# Patient Record
Sex: FEMALE | Race: WHITE | NOT HISPANIC OR LATINO | RURAL
[De-identification: names, ages, dates, MRNs, and addresses within clinical notes are randomized per-mention and may not be internally consistent; named-entity substitution may affect disease eponyms.]

---

## 2020-08-27 ENCOUNTER — HISTORICAL (OUTPATIENT)
Dept: ADMINISTRATIVE | Facility: HOSPITAL | Age: 45
End: 2020-08-27

## 2020-08-27 LAB
25(OH)D3 SERPL-MCNC: 49 NG/ML
ALBUMIN SERPL BCP-MCNC: 4.4 G/DL (ref 3.5–5)
ALBUMIN/GLOB SERPL: 1.3 {RATIO}
ALP SERPL-CCNC: 62 U/L (ref 37–98)
ALT SERPL W P-5'-P-CCNC: 43 U/L (ref 13–56)
ANION GAP SERPL CALCULATED.3IONS-SCNC: 11 MMOL/L (ref 7–16)
AST SERPL W P-5'-P-CCNC: 27 U/L (ref 15–37)
BASOPHILS # BLD AUTO: 0.06 X10E3/UL (ref 0–0.2)
BASOPHILS NFR BLD AUTO: 0.7 % (ref 0–1)
BILIRUB SERPL-MCNC: 0.3 MG/DL (ref 0–1.2)
BUN SERPL-MCNC: 12 MG/DL (ref 7–18)
BUN/CREAT SERPL: 19
CALCIUM SERPL-MCNC: 9.4 MG/DL (ref 8.5–10.1)
CHLORIDE SERPL-SCNC: 103 MMOL/L (ref 98–107)
CHOLEST SERPL-MCNC: 193 MG/DL
CHOLEST/HDLC SERPL: 2.7 {RATIO}
CO2 SERPL-SCNC: 26 MMOL/L (ref 21–32)
CREAT SERPL-MCNC: 0.63 MG/DL (ref 0.5–1.02)
EOSINOPHIL # BLD AUTO: 0.18 X10E3/UL (ref 0–0.5)
EOSINOPHIL NFR BLD AUTO: 2.2 % (ref 1–4)
ERYTHROCYTE [DISTWIDTH] IN BLOOD BY AUTOMATED COUNT: 12.7 % (ref 11.5–14.5)
EST. AVERAGE GLUCOSE BLD GHB EST-MCNC: 90 MG/DL
GLOBULIN SER-MCNC: 3.5 G/DL (ref 2–4)
GLUCOSE SERPL-MCNC: 74 MG/DL (ref 74–106)
HBA1C MFR BLD HPLC: 5.3 %
HCT VFR BLD AUTO: 42 % (ref 38–47)
HDLC SERPL-MCNC: 72 MG/DL
HGB BLD-MCNC: 14.1 G/DL (ref 12–16)
IMM GRANULOCYTES # BLD AUTO: 0.02 X10E3/UL (ref 0–0.04)
IMM GRANULOCYTES NFR BLD: 0.2 % (ref 0–0.4)
LDLC SERPL CALC-MCNC: 91 MG/DL
LYMPHOCYTES # BLD AUTO: 2.38 X10E3/UL (ref 1–4.8)
LYMPHOCYTES NFR BLD AUTO: 29.5 % (ref 27–41)
MCH RBC QN AUTO: 30.9 PG (ref 27–31)
MCHC RBC AUTO-ENTMCNC: 33.6 G/DL (ref 32–36)
MCV RBC AUTO: 92.1 FL (ref 80–96)
MONOCYTES # BLD AUTO: 0.61 X10E3/UL (ref 0–0.8)
MONOCYTES NFR BLD AUTO: 7.5 % (ref 2–6)
MPC BLD CALC-MCNC: 10.7 FL (ref 9.4–12.4)
NEUTROPHILS # BLD AUTO: 4.83 X10E3/UL (ref 1.8–7.7)
NEUTROPHILS NFR BLD AUTO: 59.9 % (ref 53–65)
NRBC # BLD AUTO: 0 X10E3/UL (ref 0–0)
NRBC, AUTO (.00): 0 /100 (ref 0–0)
PLATELET # BLD AUTO: 333 X10E3/UL (ref 150–400)
POTASSIUM SERPL-SCNC: 3.5 MMOL/L (ref 3.5–5.1)
PROT SERPL-MCNC: 7.9 G/DL (ref 6.4–8.2)
RBC # BLD AUTO: 4.56 X10E6/UL (ref 4.2–5.4)
SODIUM SERPL-SCNC: 136 MMOL/L (ref 136–145)
TRIGL SERPL-MCNC: 149 MG/DL
TSH SERPL DL<=0.005 MIU/L-ACNC: 1.21 UIU/ML (ref 0.36–3.74)
WBC # BLD AUTO: 8.08 X10E3/UL (ref 4.5–11)

## 2020-10-15 ENCOUNTER — HISTORICAL (OUTPATIENT)
Dept: ADMINISTRATIVE | Facility: HOSPITAL | Age: 45
End: 2020-10-15

## 2020-10-21 LAB — SARS-COV+SARS-COV-2 AG RESP QL IA.RAPID: NEGATIVE

## 2022-01-09 ENCOUNTER — OFFICE VISIT (OUTPATIENT)
Dept: FAMILY MEDICINE | Facility: CLINIC | Age: 47
End: 2022-01-09
Payer: COMMERCIAL

## 2022-01-09 VITALS — TEMPERATURE: 99 F | HEART RATE: 87 BPM | OXYGEN SATURATION: 95 %

## 2022-01-09 DIAGNOSIS — Z20.822 CONTACT WITH AND (SUSPECTED) EXPOSURE TO COVID-19: ICD-10-CM

## 2022-01-09 DIAGNOSIS — R05.9 COUGH: ICD-10-CM

## 2022-01-09 DIAGNOSIS — F19.939 WITHDRAWAL FROM OTHER PSYCHOACTIVE SUBSTANCE: Primary | ICD-10-CM

## 2022-01-09 LAB
CTP QC/QA: YES
FLUAV AG NPH QL: NEGATIVE
FLUBV AG NPH QL: NEGATIVE
SARS-COV-2 AG RESP QL IA.RAPID: NEGATIVE

## 2022-01-09 PROCEDURE — 99051 PR MEDICAL SERVICES, EVE/WKEND/HOLIDAY: ICD-10-PCS | Mod: ,,, | Performed by: NURSE PRACTITIONER

## 2022-01-09 PROCEDURE — 1159F PR MEDICATION LIST DOCUMENTED IN MEDICAL RECORD: ICD-10-PCS | Mod: ,,, | Performed by: NURSE PRACTITIONER

## 2022-01-09 PROCEDURE — 99213 OFFICE O/P EST LOW 20 MIN: CPT | Mod: ,,, | Performed by: NURSE PRACTITIONER

## 2022-01-09 PROCEDURE — 1160F PR REVIEW ALL MEDS BY PRESCRIBER/CLIN PHARMACIST DOCUMENTED: ICD-10-PCS | Mod: ,,, | Performed by: NURSE PRACTITIONER

## 2022-01-09 PROCEDURE — 1159F MED LIST DOCD IN RCRD: CPT | Mod: ,,, | Performed by: NURSE PRACTITIONER

## 2022-01-09 PROCEDURE — 87428 POCT SARS-COV2 (COVID) WITH FLU ANTIGEN: ICD-10-PCS | Mod: QW,,, | Performed by: NURSE PRACTITIONER

## 2022-01-09 PROCEDURE — 87428 SARSCOV & INF VIR A&B AG IA: CPT | Mod: QW,,, | Performed by: NURSE PRACTITIONER

## 2022-01-09 PROCEDURE — 99051 MED SERV EVE/WKEND/HOLIDAY: CPT | Mod: ,,, | Performed by: NURSE PRACTITIONER

## 2022-01-09 PROCEDURE — 99213 PR OFFICE/OUTPT VISIT, EST, LEVL III, 20-29 MIN: ICD-10-PCS | Mod: ,,, | Performed by: NURSE PRACTITIONER

## 2022-01-09 PROCEDURE — 1160F RVW MEDS BY RX/DR IN RCRD: CPT | Mod: ,,, | Performed by: NURSE PRACTITIONER

## 2022-01-30 NOTE — PROGRESS NOTES
EDDIE Huitron   [facility]  [address] [place] [id number]  [phone]      PATIENT NAME: Ebony Lucero  : 1975  DATE: 22  MRN: [id number]      Billing Provider: EDDIE Huitron  Level of Service:   Patient PCP Information     Provider PCP Type    Primary Doctor No General          Reason for Visit / Chief Complaint: URI (Coughing, congestion, drainage, headache, body aches, joint aches, ear aches; started Thursday; vaccinated)       Update PCP  Update Chief Complaint         History of Present Illness / Problem Focused Workflow     Patient presents to clinic with the above listed complaints x 4 days. States she recently tried to stop her antidepressant and believes her symptoms may be attributed to that.       Review of Systems     Review of Systems   Constitutional: Negative for chills, diaphoresis, fatigue and fever.   HENT: Positive for congestion. Negative for ear pain, facial swelling and trouble swallowing.    Eyes: Negative for pain, discharge, redness, itching and visual disturbance.   Respiratory: Positive for cough. Negative for apnea, chest tightness, shortness of breath and wheezing.    Cardiovascular: Negative for chest pain, palpitations and leg swelling.   Gastrointestinal: Negative for abdominal pain, constipation, diarrhea, nausea and vomiting.   Genitourinary: Negative for dysuria.   Musculoskeletal: Positive for myalgias.   Skin: Negative for rash.   Neurological: Positive for headaches. Negative for dizziness.       Medical / Social / Family History   History reviewed. No pertinent past medical history.    History reviewed. No pertinent surgical history.    Social History  Ms.      Family History  Ms.'s family history is not on file.    Medications and Allergies     Medications  No outpatient medications have been marked as taking for the 22 encounter (Office Visit) with EDDIE Huitron.       Allergies  Review  of patient's allergies indicates:  No Known Allergies    Physical Examination     Vitals:    01/09/22 0815   Pulse: 87   Temp: 98.6 °F (37 °C)     Physical Exam  Constitutional:       General: She is not in acute distress.     Appearance: She is ill-appearing.   HENT:      Right Ear: External ear normal.      Left Ear: External ear normal.      Nose: Nose normal.   Eyes:      Extraocular Movements: Extraocular movements intact.      Conjunctiva/sclera: Conjunctivae normal.      Pupils: Pupils are equal, round, and reactive to light.   Cardiovascular:      Rate and Rhythm: Normal rate and regular rhythm.      Pulses: Normal pulses.      Heart sounds: Normal heart sounds.   Pulmonary:      Effort: Pulmonary effort is normal.      Breath sounds: Normal breath sounds.   Neurological:      Mental Status: She is alert.         Assessment and Plan (including Health Maintenance)      Problem List  Smart Sets  Document Outside HM   :    Health Maintenance Due   Topic Date Due    Hepatitis C Screening  Never done    Lipid Panel  Never done    COVID-19 Vaccine (1) Never done    HIV Screening  Never done    TETANUS VACCINE  Never done    Mammogram  Never done    Cervical Cancer Screening  Never done    Influenza Vaccine (1) Never done       Problem List Items Addressed This Visit    None     Visit Diagnoses     Contact with and (suspected) exposure to covid-19    -  Primary    Relevant Orders    POCT SARS-COV2 (COVID) with Flu Antigen (Completed)          The patient has no Health Maintenance topics of status Not Due    No future appointments.       Signature:  EDDIE Huitron  [facility] [address] [address]  [phone]    Date of encounter: 1/9/22

## 2024-05-13 RX ORDER — BACLOFEN 10 MG/1
10 TABLET ORAL 3 TIMES DAILY
COMMUNITY

## 2024-05-13 RX ORDER — OXYCODONE HCL 20 MG/1
20 TABLET, FILM COATED, EXTENDED RELEASE ORAL EVERY 12 HOURS
COMMUNITY

## 2024-05-13 RX ORDER — OXYCODONE AND ACETAMINOPHEN 10; 325 MG/1; MG/1
1 TABLET ORAL EVERY 8 HOURS PRN
COMMUNITY

## 2024-05-15 ENCOUNTER — ANESTHESIA (OUTPATIENT)
Dept: PAIN MEDICINE | Facility: HOSPITAL | Age: 49
End: 2024-05-15
Payer: COMMERCIAL

## 2024-05-15 ENCOUNTER — ANESTHESIA EVENT (OUTPATIENT)
Dept: PAIN MEDICINE | Facility: HOSPITAL | Age: 49
End: 2024-05-15
Payer: COMMERCIAL

## 2024-05-15 ENCOUNTER — HOSPITAL ENCOUNTER (OUTPATIENT)
Facility: HOSPITAL | Age: 49
Discharge: HOME | End: 2024-05-15
Attending: ANESTHESIOLOGY | Admitting: ANESTHESIOLOGY
Payer: COMMERCIAL

## 2024-05-15 VITALS
SYSTOLIC BLOOD PRESSURE: 115 MMHG | OXYGEN SATURATION: 100 % | HEIGHT: 62 IN | DIASTOLIC BLOOD PRESSURE: 64 MMHG | BODY MASS INDEX: 28.52 KG/M2 | WEIGHT: 155 LBS | TEMPERATURE: 98 F | RESPIRATION RATE: 17 BRPM | HEART RATE: 62 BPM

## 2024-05-15 DIAGNOSIS — M54.12 CERVICAL RADICULOPATHY: ICD-10-CM

## 2024-05-15 PROCEDURE — 27000284 HC CANNULA NASAL: Performed by: NURSE ANESTHETIST, CERTIFIED REGISTERED

## 2024-05-15 PROCEDURE — D9220A PRA ANESTHESIA: Mod: ,,, | Performed by: NURSE ANESTHETIST, CERTIFIED REGISTERED

## 2024-05-15 PROCEDURE — 37000008 HC ANESTHESIA 1ST 15 MINUTES: Performed by: ANESTHESIOLOGY

## 2024-05-15 PROCEDURE — 62321 NJX INTERLAMINAR CRV/THRC: CPT | Performed by: ANESTHESIOLOGY

## 2024-05-15 PROCEDURE — 63600175 PHARM REV CODE 636 W HCPCS: Performed by: NURSE ANESTHETIST, CERTIFIED REGISTERED

## 2024-05-15 PROCEDURE — 25500020 PHARM REV CODE 255: Performed by: ANESTHESIOLOGY

## 2024-05-15 PROCEDURE — 25000003 PHARM REV CODE 250: Performed by: NURSE ANESTHETIST, CERTIFIED REGISTERED

## 2024-05-15 PROCEDURE — 63600175 PHARM REV CODE 636 W HCPCS: Mod: JG | Performed by: ANESTHESIOLOGY

## 2024-05-15 RX ORDER — PANTOPRAZOLE SODIUM 40 MG/1
40 TABLET, DELAYED RELEASE ORAL DAILY
COMMUNITY

## 2024-05-15 RX ORDER — TRIAMCINOLONE ACETONIDE 40 MG/ML
INJECTION, SUSPENSION INTRA-ARTICULAR; INTRAMUSCULAR CODE/TRAUMA/SEDATION MEDICATION
Status: DISCONTINUED | OUTPATIENT
Start: 2024-05-15 | End: 2024-05-15 | Stop reason: HOSPADM

## 2024-05-15 RX ORDER — BUPIVACAINE HYDROCHLORIDE 2.5 MG/ML
INJECTION, SOLUTION INFILTRATION; PERINEURAL CODE/TRAUMA/SEDATION MEDICATION
Status: DISCONTINUED | OUTPATIENT
Start: 2024-05-15 | End: 2024-05-15 | Stop reason: HOSPADM

## 2024-05-15 RX ORDER — IOPAMIDOL 612 MG/ML
INJECTION, SOLUTION INTRATHECAL CODE/TRAUMA/SEDATION MEDICATION
Status: DISCONTINUED | OUTPATIENT
Start: 2024-05-15 | End: 2024-05-15 | Stop reason: HOSPADM

## 2024-05-15 RX ORDER — LIDOCAINE HYDROCHLORIDE 20 MG/ML
INJECTION, SOLUTION EPIDURAL; INFILTRATION; INTRACAUDAL; PERINEURAL
Status: DISCONTINUED | OUTPATIENT
Start: 2024-05-15 | End: 2024-05-15

## 2024-05-15 RX ORDER — PROPOFOL 10 MG/ML
VIAL (ML) INTRAVENOUS
Status: DISCONTINUED | OUTPATIENT
Start: 2024-05-15 | End: 2024-05-15

## 2024-05-15 RX ADMIN — LIDOCAINE HYDROCHLORIDE 50 MG: 20 INJECTION, SOLUTION INTRAVENOUS at 09:05

## 2024-05-15 RX ADMIN — PROPOFOL 100 MG: 10 INJECTION, EMULSION INTRAVENOUS at 09:05

## 2024-05-15 RX ADMIN — PROPOFOL 50 MG: 10 INJECTION, EMULSION INTRAVENOUS at 09:05

## 2024-05-15 NOTE — DISCHARGE SUMMARY
Patient underwent   Catheter Guided Cervical Epidural Steroid Injection under Fluoroscopic Guidance at C5-6  level procedure 05/15/2024. The pt will follow up in clinic. Discharged home. Discharge Dx:   Cervical Radiculopathy

## 2024-05-15 NOTE — H&P
[facility] - Pain Management  Pain Management  H&P    Patient Name: Ebony Lucero  MRN: [id number]  Admission Date: 5/15/2024  Primary Care Provider: Fabiana Primary Doctor    Patient information was obtained from     Subjective:     Principal Problem:  MITESH HOLGUIN MD,P.A.  [address] [place] [id number]                      RE: Ebony Lucero      : 1975   Date of Service: 2024   Medication Refill   Existing Patient 1 month follow up           Chief Complaint:   Neck pain described as constant,  Back pain burning in nature, constant, tingling; located in the thoracic region; aggravated by sitting, standing; relieved by heat, rest. Patient stated there is no improvement of back pain since last visit..   Patient seated in exam room 10 with c/o back pain  Controlled Substance Prescription Agreement signed and reviewed. Verbalizes understanding. 3-22-24  [place] Prescription Monitoring Program data was reviewed for this patient for the past 12 calendar months to ascertain any current,or past use of scheduled medications.                                                                                    Opioid Risk Tool                                                                                 Wayne each box                           Item Score                        Item Score                                                                              that applies.                               if Female.                          if Male                    1. Family history of substance abuse                  Alcohol  (  )                                      1                                     3                                                                              Illegal Drugs (  )                               2                                     3                                                                              Prescription Drugs (  )                     4                                      4        2. Personal History of substance abuse          Alcohol  (  )                                      3                                      3                                                                             Illegal Drugs (  )                               4                                     4                                                                             Prescription Drugs (  )                     5                                      5     3. Age (Wayne box if 16-45)                                           (  )                                          1                                      1        4. History of Preadolescent Sexual Abuse                   (  )                                         3                                      0        5. Psychological Disease    Attention Deficit disorder  (  )                                         2                                       2                                                             or                                              Obsessive Compulsive                                                           disorder                                                               or                                                                                       Bipolar Schizophrenia                                                              Depression                        (  )                                         1                                        1        Total=1     Total Score Risk Category           Low risk 0-3         Moderate risk 4-7              High risk >8           History of Present Illness:   What part of the body? back/neck/mid back   Pain level at present 6      03/22/2024-History of present illness  Ebony Lucero  is a  48y/o female who presents for evaluation and management of chronic pain in her lower back and bilateral hips.   Subjective: Patient  presents today for evaluation for pain problems. Pt reports that pain started has been chronic for about 6 years but has been ongoing for years after accident. She ahs treated with TPC but was ready for a change. After review of records, pts physical exam and discussion with the patient we will plan to continue her meds. We will send her to PT for back strengthening exercises. She will consider TFLESI L5-S1, Consider TESI and consider SCS.      04/23/2024-pt here for follow up after her new pt appt and reports that she would like to schedule procedure for her neck and arm pain. We have reviewed her Cervical spine MRI again with the pt and after discussion with the pt based on her physical exam, symptoms and her MRI we will plan to schedule her for a Cath guided OTONIEL @ C5-6 level for dx of cervical radiculopathy.    Due to the presence of cervical radicular symptoms, we have elected to proceed with cervical epidural steroid injections at the level confirmed by physical examination and accompanying studies. This is medically necessary to improve function, reduce pain and limitations, and to reduce the reliance on pain medications. Additional epidural steroid injections will be based on patient improvement. Cath guided OTONIEL @ C5-6 level (M54.12) - Cervical radiculopathy     Cervical MRI dated 08/24/2023C5-6: Bilateral facet arthropathy. Disc is desiccated and mildly narrowed. There is disc bulge with superimposed broad-based posterior disc herniation measuring 3 mm AP dimension beyond vertebral body margin. There is mild abutment of the ventral cord with minimal cord flattening and mild-moderate canal stenosis. Extension of broad-based disc herniation and associated uncinate hypertrophy in combination with the facet arthropathy results in moderate to severe bilateral foraminal stenosis, slightly greater on the left.     The previous urine drug screen was evaluated   , New pt UDS.         We have previously discussed the  new CDC guidelines for opioid prescribing practices. We have educated the patient about morphine milliequivalents. Patient has voiced understanding that medications may be decreased over the next several months if we do not find adequate pain control or increased function as result of medication. We have also discussed with the patient that more frequent follow-ups will be based on the amount of pain medications used daily.         Nursing:   Is it helping? Yes  Physical Therapy  no Home Exercises  no New medical problems or surgeries  no New medications  no New allergies  no New antibiotics, fever, infection      Current Medications:   Baclofen Oral Tablet 10 MG (4/9/2024) Take 1 to 1and1/2 tablets every night at bedtime for 30 day(s)  oxyCODONE HCl ER Oral Tablet ER 12 Hour Abuse-Deterrent 20 MG (4/22/2024) Take 1 tablet every 12 hours for 30 day(s)  Percocet Oral Tablet  MG (4/22/2024) Take 1 tablet three times a day as needed for 30 day(s)      Previous Studies:  MRI; Findings  STUDY:  MRI thoracic spine  HISTORY:  Back pain right lower extremity pain for 6 years. Original injury 2018 related to a fall.  TECHNIQUE: Sagittal T1 weighted, T2 weighted and STIR images of the thoracic spine and axial T1 weighted and T2 weighted images of the thoracic spine are presented.    FINDINGS:   Coronal  view demonstrates moderate levoscoliosis of the thoracic spine. The height of the thoracic vertebral bodies is maintained and there is otherwise normal kyphotic curvature and alignment. There is some disc space narrowing and loss of disc signal consistent with disc desiccation discogenic change from the T5-6 through T9-10 levels. The remaining discs are normally hydrated. No obvious abnormal signal within the cord is seen.  T6-7: There is a prominent right paracentral disc herniation extending approximately 4 mm beyond the vertebral body margin and impinging on the right ventral aspect of the cord. There is  flattening of the right ventral aspect of the cord. No central canal stenosis is seen. The neural canals remain patent superiorly.  T7-8: There is a left paracentral left lateral disc herniation extending approximately 3 mm beyond the vertebral body margin contributing to some narrowing of the left neural canal. No cord deformity is seen. No central canal stenosis is seen. The right neural canal is patent.  T9-10: There is a large left paracentral left lateral disc herniation which extends approximately 6-7 mm beyond the vertebral body margin and extends both above and below the disc level impinging on the left ventral aspect of the cord causing flattening of the left ventral aspect of the cord and contributing to mild central canal stenosis. Disc contributes to left neural canal compromise. The right neural canal remains patent superiorly.    IMPRESSION:     1. Moderate levoscoliosis of the thoracic spine.  2. Prominent right paracentral disc herniation T6-7 with cord impingement.  3. Left paracentral left lateral disc herniation T7-8 contributing to left neural canal compromise.  4. Large left paracentral left lateral disc herniation T9-10 with cord impingement, central canal stenosis and left neural canal compromise.      Electronically Signed by AWILDA CALDERON at 14-Dec-2022 06:33:52 AM     Contrast Type and Amount: NO CONTRAST     EXAM:MRI cervical spine without contrast  HISTORY:  Cervical radiculopathy. Left-sided neck pain.  COMPARISON:  No prior exam of the cervical spine. Correlation made with MRI thoracic spine December 2022  TECHNIQUE:   Sagittal T1, T2, stir and axial T2 sequences of the cervical spine were performed without intravenous contrast.  FINDINGS:   Cervical vertebral alignment is normal. Vertebral bodies normal in height. Marrow signal pattern exhibited in the vertebrae is normal.  Imaged portions of the posterior fossa and craniocervical junction are normal.  C1-2: Alignment is normal. There is  no canal narrowing.  C2-3: No disc herniation or canal stenosis. Neural foramina are patent.  C3-4: Uncinate hypertrophy and facet arthropathy mildly narrows right foraminal caliber. No disc herniation or central canal stenosis.  C4-5: Bilateral facet arthropathy.  The disc is desiccated and mildly bulging circumferentially approximately 2 mm. Disc bulge in combination with uncinate hypertrophy causes moderate right and mild left foraminal stenosis. Disc bulging mildly deforms the anterior thecal sac but there is no cord contact. Mild canal stenosis.  C5-6: Bilateral facet arthropathy. Disc is desiccated and mildly narrowed. There is disc bulge with superimposed broad-based posterior disc herniation measuring 3 mm AP dimension beyond vertebral body margin. There is mild abutment of the ventral cord with minimal cord flattening and mild-moderate canal stenosis. Extension of broad-based disc herniation and associated uncinate hypertrophy in combination with the facet arthropathy results in moderate to severe bilateral foraminal stenosis, slightly greater on the left.  C6-7: Bilateral facet arthropathy. Disc desiccation and disc bulge with superimposed broad-based disc herniation centrally and lateralizing to the left. This disc herniation measures up to 3.4 mm beyond expected vertebral body margin. There is effacement of CSF anterior to the spinal cord with mild canal stenosis. Asymmetric extension of disc herniation into the left neural foramen causes severe left foraminal stenosis.  C7-T1: No disc herniation or significant spinal canal or foraminal narrowing. Upper thoracic levels are unremarkable.  No cord signal alteration. No concerning abnormality exhibited in the immediate paraspinal soft tissues.  IMPRESSION:     Broad-based posterior disc herniation centrally and lateralizing to the left at C6-7 effaces CSF anterior to the spinal cord mild canal stenosis and extends into and causes severe narrowing of the  left neural foramen in combination with uncinate hypertrophy and facet arthropathy.  Disc bulge with superimposed broad-based posterior disc herniation and vertebral osteophyte complex at C5-6 causes mild cord abutment and mild to moderate canal stenosis. In combination with uncinate hypertrophy and facet arthropathy there is severe bilateral foraminal narrowing.  No cord signal alteration.    Electronically Signed by VALERIA ERIC at 24-Aug-2023 11:51:10 AM     Contrast Type and Amount: NO CONTRAST  <Addendum Signed by VALERIA ERIC at 22-Dec-2023 10:29:54 AM  No unanticipated enhancement detected on postcontrast images.  Addendum End>  EXAM: MRI lumbar spine with and without contrast  HISTORY:  Lumbar radiculopathy. Back injury from a fall with pain in the upper left side  COMPARISON:  No previous lumbar spine exam. Correlation is made with MRI thoracic spine December 2022   TECHNIQUE:   Sagittal T1, T2, stir and axial T1 and T2 weighted sequences of the lumbar spine were performed before intravenous contrast. Subsequently intravenous contrast was administered and axial and sagittal T1 weighted sequences were performed.  FINDINGS:    There are postoperative changes of anterior and posterior fusion at L5-S1 across grade 1 spondylolisthesis. Bilateral pedicle screws and rods and anterior and interbody fusion hardware are present related to the fusion. There is no significant surrounding marrow or soft tissue edema to strongly indicate hardware complication. Above this level alignment of lumbar vertebrae is normal. Vertebral bodies are normal in height. Lower cord is normal in signal. Conus terminates at T12-L1.  Mild desiccation of disc at L4-5. Minimal osteophytosis and very subtle discogenic Modic type 2 endplate signal changes are present in the L4 and L5 vertebral bodies.  L1-2: No disc herniation or canal or foraminal narrowing.  L2-3: No disc herniation or canal or foraminal stenosis.  L3-4: No disc  herniation or canal or foraminal stenosis.  L4-5: Disc is desiccated and there is 2 mm of circumferential bulging. No significant nerve root displacement or spinal canal stenosis. Neural foramina are patent.  L5-S1: Postoperative changes of fusion across grade 1 spondylolisthesis at this level. There is no residual disc. No spinal canal or foraminal stenosis.  Upper sacrum is unremarkable. No concerning abnormality exhibited in the immediate paraspinal soft tissues.  IMPRESSION:     Postoperative changes of fusion at L5-S1. No evidence of hardware complication or canal stenosis at the operative level. There is underlying grade 1 spondylolisthesis at this level.  Circumferential disc bulging at L4-5 without canal stenosis.  No acute fracture.     Electronically Signed by VALERIA ERIC at 22-Dec-2023 10:15:52 AM  Amended by VALERIA ERIC at 22-Dec-2023 10:29:54 AM  Contrast Type and Amount: 13ML OF 15ML CLARISCAN GIVEN  EXAM: MRI thoracic spine without contrast  HISTORY:  Thoracic radiculopathy. Upper to mid back pain with recent worsening extending to the left side with some weakness.  COMPARISON:  MRI thoracic spine December 2022   TECHNIQUE:   Sagittal T1, T2, stir and axial T1 and T2 weighted sequences of the thoracic spine were performed without intravenous contrast.  FINDINGS:    Mildly accentuated thoracic kyphosis.  Moderate levoscoliosis of the thoracic spine also again exhibited.  The vertebral bodies normal in height.  There is multilevel disc desiccation and disc narrowing similar in pattern to the degenerative changes present on prior exam predominantly involving T5 through T10 levels.  Throughout these levels there are marginal osteophytes from anterior vertebral bodies .  There has been development of adjacent endplate defects in inferior T9 and superior T10 vertebral bodies with surrounding edema suggesting Schmorl's nodes and discogenic type 1 endplate signal change.  Mild chronic Modic type 2  predominant endplate signal changes are also seen in adjacent vertebral bodies at T6-7 and T7-8.  T1-2:  1 mm disc bulge.  No cord contact or canal narrowing.  T2-3:  1 mm disc bulge minimally deforms the anterior thecal sac lateralizing to the left. No cord contact or canal stenosis. Caliber of left neural foramen appears mildly narrowed from disc bulging and facet arthropathy.  Similar findings were present previously.  T3-4:  No disc herniation or canal narrowing.  T4-5:  No disc herniation or canal narrowing.  T5-6:  Disc is narrowed anteriorly. However there is no significant posterior disc herniation or canal narrowing.  T6-7:  Chronic degeneration of the disc which is moderately desiccated and narrowed.  Posteriorly there is a large right paracentral disc herniation and vertebral osteophyte complex that measures 4 mm AP dimension deforming right aspect of the spinal cord with moderate indentation of the right ventral aspect of the cord that is very similar to that seen previously.  Adequate CSF remains present posterior to the cord without significant central canal stenosis.  Neural foramina are patent.  T7-8:  3 mm AP dimension left paracentral disc herniation deforms left anterior aspect of the thecal sac. This is unchanged.  No significant spinal canal narrowing.    T8-9:  1 mm of disc bulging.  No cord contact or canal narrowing.  T9-10:  Disc is desiccated and significantly narrowed.  There is a large left paracentral predominant disc herniation and vertebral osteophyte complex measuring 6 mm beyond vertebral body margin in AP dimension and approximately 10-11 mm craniocaudal extent moderately deforming the left aspect of the spinal cord displacing the cord to the right.  There is canal stenosis and an element of narrowing of the proximal left neural foramen.  No significant change compared to prior exam.  T10-11:  No disc herniation or canal narrowing.  T11-12:  No disc herniation or canal  narrowing.  T12-L1:  No disc herniation or canal narrowing.  No areas of cord signal alteration despite the significant cord deformity again exhibited at T6-7 and T9-10 levels.  No concerning abnormality exhibited in the immediate paraspinal soft tissues.  IMPRESSION:     Levoscoliosis of the thoracic spine with multilevel disc degeneration and redemonstration of large chronic disc herniations causing significant cord deformity at T9-10 and T6-7 that are very similar to that seen previously.  Left paracentral disc herniation at T7-8 also appears similar in size compared to prior exam measuring approximately 3 mm AP dimension deforming left anterior aspect of the thecal sac.  There has been development of endplate defects typical for Schmorl's nodes in T9 and T10 vertebral bodies with rim of surrounding marrow edema about the Schmorl's nodes.  The appearance of the thoracic spine has otherwise not significantly changed.  No cord signal alteration.      Electronically Signed by VALERIA ERIC at 15-Feb-2024 03:02:05 PM     Contrast Type and Amount: NO CONTRAST GIVEN   Surgical History:   Hysterectomy; Spinal Surgery   Uterine Suspension  Cholecystectomy  ALIF L5-S1      Social History:      Smoking Status: Never smoker; Last Reviewed: 04/22/2024                        Denies alcohol use  No drug use  Occupation: works full-time  Marital status: Single               Family History:   There is a family history of  Hypertension, Cancer  Heart Disease  .      Review of Systems:   General:  Patient denies  sweats, fatigue, fever, chills, recent change in weight.  Eyes:  Patient denies  blurred vision, glaucoma.  Ears, Nose and Throat:  Patient denies  hearing loss, ringing in the ears, sinus congestion, difficulty swallowing.  Cardiovascular:  Patient denies  arrhythmia, chest pain, palpitations.  Respiratory:  Patient denies  requiring oxygen, shortness of breath, cough, wheezing.  Gastrointestinal:  Patient denies   ulcer, heartburn, nausea, vomiting, blood in stool, diarrhea, constipation, hemorrhoids.  Genitourinary:  Patient denies  hematuria, kidney stones, urinary frequency, polyuria, urinary hesitancy, dysuria, burning on urination, prostate problems, menstrual complications, painful intercourse.  Endocrine:  Patient denies  polydipsia, temperature intolerance, thyroid problems, loss of hair from head or body.  Hematologic:  Patient denies  bleeding tendencies, easy bruising tendency, bleeding disorders, bleeding gums.  Musculoskeletal:   Patient admits to   joint pain, joint stiffness, muscle cramps.  Patient denies  fractures, walking aids.  Neurologic  Patient denies  dizziness, seizures, headache, not confused or disoriented, memory lapses or loss, paralysis, difficulty walking.  Psychologic:  Patient denies  anxiety, panic attacks, mood disorder, emotional problems, depression, sleep disturbances, suicidal thoughts, suicide attempt(s).  Skin:  Patient denies  pruritus, jaundice, skin rash.       DEPRESSION SCREENING:   Not at all the patient reports little interest or pleasure in doing things.  Not at all the patient reports feeling down, depressed, or hopeless.  Date Depression Screening Last Done: 04/22/2024   PHQ-2 Score 0; PHQ-9 Score incomplete      Vital Signs:   Weight 139 lbs; Height 5 ft 2 in; BMI 25.4   04/22/2024 11:20 AM (CST)  Respiration Rate 18; Pulse Rate 69 bpm; Blood Pressure 105 / 72 mm/Hg; Pain Level: 6         Physical Examination:   Cranial Nerves II-XII grossly intact.  No apparent distress.  No somnolence or slurred speech.     Lumbar spine  patient has pain with flexion and extension lateral rotation  Lumbar facets are tender to palpation  No focal neurologic deficits noted      Back Motion:   Lumbar / lumbosacral spine abnormal.         Additional Physical Findings:  General abnormal,   Awake, alert, oriented to time, place and person, well developed, pleasant, uncomfortable,  seated  Musculoskeletal abnormal,   Thoracic spine abnormal, thoracolumbar spine abnormal  Posture normal  Neurologic normal neurologic exam,   Cranial nerves, motor function  Gait and stance normal  Skin normal skin exam,   Dry, warm       Toxicology Report   Toxicology was performed.   Reason for Toxicology:  A presumptive urine drug screen was done today to rapidly obtain and integrate results into clinical assessment and decision-making for ongoing safe prescribing of controlled substances.                                       Assessment:   (M54.50) - Low back pain  (M47.817) - Lumbosacral spondylosis without myelopathy  (M54.6) - Thoracic back pain  (M54.2) - Neck pain  (M40.209) - Kyphosis  (Z98.890) - Hx of lumbosacral spine surgery  (M54.12) - Cervical radiculopathy  (G89.29) - Other chronic pain  (Z79.891) - Long term (current) use of opiate analgesic      Plan:   Physical Activity Counseling   Nutrition Counseling      Analgesia: Patient reports adequate levels on analgesia.  Activity: Patient encouraged to exercise and continue normal activities.  Adverse Reactions:  No adverse reactions  Aberrant Behavior: No aberrant behavior noted.  appropriate and UDS consistent  Affect: Normal mood.  Adjuncts:        Functional Goals: Patient will have decreased pain with current medications and have increased function/activities.  Progress toward functioning goals: Pt will maintain/and or have increase in function and activity with current meds.  Reason for initiating/continuing opioids: Improve function, reduce pain, reduce use of er/urgent care and minimize organ toxicity from analgesics.  Continue medication doses as currently prescribed:  Other treatment modalities/referrals recommended:  Risks and benefits of test or referrals discussed:  Urine Drug screen ordered:  Contract/agreement signed or updated using lay language.  Follow up interval:  1-3 months  Follow up with Dr. Briceño.        1. The patient  has chronic nonmalignant pain with pathology likely contributing to the symptoms and based on the improvement of pain and function in response to opioid medications; lack of serious side effects and limited identifiable aberrant behavior; I believe it is reasonable to prescribe opioid therapy which requires a greater than 72 hours supply of opioid therapy. Patient was educated on the potential dependency issues associated with long-term opioid use; as well as the decreasing efficacy with prolonged use. Patient has been advised of the risk/benefits and side effect and how to utilize each medication. Patient was informed that and deviation from therapy protocol could lead to discontinuation of opioids. Patient was given the opportunity to begin weaning off opioids. Patient was given and opportunity to ask and have questions answered regarding the continuation of opioid therapy. At the time patient is at goal in terms of the pain treatment plan. Patients medications have been reviewed with patient. We will follow up with patient to review effectiveness of medication, and to discuss any potential side effects. The morphine milliequivalents (MME) have been calculated for this patient. According to the CDC guidelines, patients with an MME less than 50 should be monitored for pain and function frequently. Therefore this patient will be monitored every 1-3 months via office visits,  evaluations, and urinary drug screens.     2. Follow up in 2 weeks after procedure.  Patient may contact office for earlier follow-up should an issue arise.     3. Due to the presence of cervical radicular symptoms, we have elected to proceed with cervical epidural steroid injections at the level confirmed by physical examination and accompanying studies. This is medically necessary to improve function, reduce pain and limitations, and to reduce the reliance on pain medications. Additional epidural steroid injections will be based on patient  improvement. Cath guided OTONIEL @ C5-6 level (M54.12) - Cervical radiculopathy     Cervical MRI dated 08/24/2023C5-6: Bilateral facet arthropathy. Disc is desiccated and mildly narrowed. There is disc bulge with superimposed broad-based posterior disc herniation measuring 3 mm AP dimension beyond vertebral body margin. There is mild abutment of the ventral cord with minimal cord flattening and mild-moderate canal stenosis. Extension of broad-based disc herniation and associated uncinate hypertrophy in combination with the facet arthropathy results in moderate to severe bilateral foraminal stenosis, slightly greater on the left.     4. Procedure instructions given to pt who verbalized understanding. Procedure sheet given to the patient after verbally voicing understanding of the sheet concerning blood thinners, NPO status, and importance of a .     5. Monitored Anesthesia Care medical necessity authorization request:       Monitor anesthesia request is medically indicated for the scheduled ___Cath guided OTONIEL Cervical _____procedure due to:     - needle phobia and anxiety, placing the patient at risk during the provided service._____  - patient has a BMI greater than 45 ____  - patient has severe sleep apnea for which BiPAP and oxygen are needed while sleeping._____  - patient is unable to follow simple commands due to mental state.____  - patient has an ASA class greater than 3 and requires constant presence of an anesthesiologist/CRNA during the procedure.____  - patient has severe problems with muscles and muscle spasticity that makes it hard to lie still. ____  - patient suffers from chronic pain and is unable to function due to diminished ADL's.____  - patient is dependent on opioids or sedatives.____  - Other ____     NARCOTIC STATEMENT  Patient is taking the narcotic pain medications as prescribed. Refill is being given because of the benefit to the patient in regards to the pain. Patient has agreed not to  abuse of medication and not to take it more than what is prescribed. The nature of the drug including the potential for addiction and dependency and abuse was also discussed with the patient. Patient has developed physical dependency for the narcotic pain medication for his pain relief.  Patient has also developed tolerance to the sedative effect of the narcotic pain medications.  Patient has decided to continue with these medications despite potential for addiction as described by this office.  This was stressed to the patient that it is the patient's responsibility to secure the narcotic medication and in any event of loss for any reason whatsoever,  there will be no refill before the next due date. Patient also understands that they are not supposed to drive or work on machinery while taking these medications.  Also explained to the patient that in the event of traffic citation, the presence of this drug in  bloodstream may result in DUI.  Patient has been advised not to drink alcohol while taking this medication.  Patient has verbalized understanding of our office policy and has signed a contract with us in this regard.        Compliance Statement:  Documented by Joan Ryan RN acting as a scribe for Claude Briceño M.D. The note accurately reflects work and decisions performed by me.      Prescriptions Written Today:  Lyrica Oral Capsule 50 MG  Take 1 capsule three times a day for 30 day(s)  Refills: 2  Rx quantity: 90,  oxyCODONE HCl ER Oral Tablet ER 12 Hour Abuse-Deterrent 20 MG  Take 1 tablet every 12 hours for 30 day(s)  Refills: No Refills  Rx quantity: 60  Take 1 tablet every 12 hours for 30 day(s)  Refills: No Refills  Rx quantity: 60,  Percocet Oral Tablet  MG  Take 1 tablet three times a day as needed for 30 day(s)  Refills: No Refills  Rx quantity: 90                 Claude Briceño MD      Electronically signed: 4/23/2024 7:28:43 AM       Chief Complaint:      HPI:       Assessment/Plan:          Claude Briceño MD  Pain Management  [facility] - Pain Management

## 2024-05-15 NOTE — PLAN OF CARE
Plan:  D/c pt via wheelchair at 1030  Informed pt if does not void in 8 hours to go to ER. Notify if redness, drainage, from injection site or fever over next 3-4 days. Rest and drink plenty of fluids for the remainder of the day. No lifting over 5 lbs. For the remainder of the day. Continue regular medications as prescribed. May take pain medications as prescribed.     Pain improved 100%  Pre-procedure pain: 5  Post-procedure pain: 0

## 2024-05-15 NOTE — ANESTHESIA POSTPROCEDURE EVALUATION
Anesthesia Post Evaluation    Patient: Ebony Lucero    Procedure(s) Performed: Procedure(s) (LRB):  INJECTION, STEROID, SPINE, CERVICAL, EPIDURAL (N/A)    Final Anesthesia Type: MAC      Patient participation: Yes- Able to Participate  Level of consciousness: awake and alert  Post-procedure vital signs: reviewed and stable  Pain management: adequate  Airway patency: patent    PONV status at discharge: No PONV  Anesthetic complications: no      Cardiovascular status: blood pressure returned to baseline, hemodynamically stable and stable  Respiratory status: unassisted  Hydration status: euvolemic  Follow-up not needed.  Comments: See nursing note for post op pain/taylor score.              Vitals Value Taken Time   /64 05/15/24 1025   Temp 36.9 °C (98.4 °F) 05/15/24 1002   Pulse 68 05/15/24 1027   Resp 17 05/15/24 1025   SpO2 100 % 05/15/24 1027   Vitals shown include unfiled device data.      Event Time   Out of Recovery 10:25:00         Pain/Taylor Score: Taylor Score: 10 (5/15/2024 10:25 AM)

## 2024-05-15 NOTE — OP NOTE
05/15/2024  Ebony Lucero 1975    PREOPERATIVE DIAGNOSIS:     Cervical Radiculopathy                                                              Neck Pain     POSTOPERATIVE DIAGNOSIS:  Cervical Radiculopathy                                                              Neck Pain     PROCEDURE:  Catheter Guided Cervical Epidural Steroid Injection under Fluoroscopic Guidance at C5-6  level.      SURGEON: Dr Claude Briceño  COMPLICATIONS: None  ANESTHESIA:  MAC  DRAINS AND PACKS:  None  BLOOD LOSS:  None     The patient was identified in the holding area.  The risks and benefits of the procedure were again explained to the patient and the patient agreed to proceed.  The patient was taken in stable condition to the procedure room and was placed in prone position on the C-Arm table.  All pressure points were checked and padded comfortably while the patient was awake.  Time out was completed.  Standard ASA monitors applied.  Anesthesia was initiated.  Patient was comfortable and the neck was then prepped and draped in usual sterile fashion. The skin wheal  using Bupivacaine 0.25% (2.5 mg/ml) 1cc was raised over the C7-T1 interspaces and an 18 gauge needle was advanced under fluoroscopic guidance into the epidural space with loss of resistance confirmed with air.  The stylet was removed and there was negative aspiration of heme and CSF.  A Versicath Catheter was advanced to the target level at  C 5-6  level. The patient then received a 2 cc allotment of Isovue M 300 contrast with excellent delineation within the epidural space.  After confirmation and appropriate placement of the cannula, the patient then received  Kenalog 40mg/ml 1ml with 2ccs of 0.25% Bupivacaine(2.5mg/ml) and 2ccs of preservative free Saline.  The needle was removed with tip intact.  There was adequate hemostasis at the conclusion of the procedure. The patient was taken in stable condition to the holding area and monitored for the appropriate time  of convalescence.  The patient tolerated the procedure well with no adverse events.      The patients preoperative pain score was  5/10.   The postoperative pain score is  /10.

## 2024-05-15 NOTE — ANESTHESIA PREPROCEDURE EVALUATION
05/15/2024  Ebony Lucero is a 49 y.o., female.      Pre-op Assessment    I have reviewed the Patient Summary Reports.     I have reviewed the Nursing Notes. I have reviewed the NPO Status.   I have reviewed the Medications.     Review of Systems  Anesthesia Hx:  No problems with previous Anesthesia             Denies Family Hx of Anesthesia complications.    Denies Personal Hx of Anesthesia complications.                    Hematology/Oncology:    Oncology Normal                                   EENT/Dental:  EENT/Dental Normal           Cardiovascular:                hyperlipidemia                             Pulmonary:        Sleep Apnea                Renal/:  Renal/ Normal                 Musculoskeletal:  Musculoskeletal Normal                Neurological:        Chronic Pain Syndrome                         Dermatological:  Skin Normal    Psych:  Psychiatric Normal                    Physical Exam  General: Well nourished, Cooperative, Alert and Oriented    Airway:  Mallampati: II   Mouth Opening: Normal  TM Distance: Normal  Tongue: Normal  Neck ROM: Normal ROM    Dental:  Intact        Anesthesia Plan  Type of Anesthesia, risks & benefits discussed:    Anesthesia Type: Gen Natural Airway, MAC  Intra-op Monitoring Plan: Standard ASA Monitors  Post Op Pain Control Plan: multimodal analgesia  Induction:  IV  Informed Consent: Informed consent signed with the Patient and all parties understand the risks and agree with anesthesia plan.  All questions answered. Patient consented to blood products? Yes  ASA Score: 2  Day of Surgery Review of History & Physical: I have interviewed and examined the patient. I have reviewed the patient's H&P dated: There are no significant changes.     Ready For Surgery From Anesthesia Perspective.     .  There is no problem list on file for this patient.   History  reviewed. No pertinent past medical history.    Past Surgical History:   Procedure Laterality Date    BACK SURGERY      CHOLECYSTECTOMY      HYSTERECTOMY         No family history on file.    Social History     Socioeconomic History    Marital status:    Tobacco Use    Smoking status: Never    Smokeless tobacco: Never   Substance and Sexual Activity    Alcohol use: Not Currently       Current Facility-Administered Medications   Medication Dose Route Frequency Provider Last Rate Last Admin    sodium chloride 0.9% bolus 500 mL 500 mL  500 mL Intravenous Continuous Claude Briceño MD           Review of patient's allergies indicates:  No Known Allergies

## 2024-05-15 NOTE — TRANSFER OF CARE
"Anesthesia Transfer of Care Note    Patient: Ebony Lucero    Procedure(s) Performed: Procedure(s) (LRB):  INJECTION, STEROID, SPINE, CERVICAL, EPIDURAL (N/A)    Patient location: Other:    Anesthesia Type: MAC    Transport from OR: Transported from OR on room air with adequate spontaneous ventilation    Post pain: adequate analgesia    Post assessment: no apparent anesthetic complications    Post vital signs: stable    Level of consciousness: responds to stimulation    Nausea/Vomiting: no nausea/vomiting    Complications: none    Transfer of care protocol was followed      Last vitals: Visit Vitals  /62   Pulse 63   Temp 36.9 °C (98.4 °F) (Oral)   Resp 14   Ht 5' 2" (1.575 m)   Wt 70.3 kg (155 lb)   SpO2 95%   BMI 28.35 kg/m²     "

## 2024-06-12 ENCOUNTER — ANESTHESIA EVENT (OUTPATIENT)
Dept: PAIN MEDICINE | Facility: HOSPITAL | Age: 49
End: 2024-06-12
Payer: COMMERCIAL

## 2024-06-12 ENCOUNTER — ANESTHESIA (OUTPATIENT)
Dept: PAIN MEDICINE | Facility: HOSPITAL | Age: 49
End: 2024-06-12
Payer: COMMERCIAL

## 2024-06-12 ENCOUNTER — HOSPITAL ENCOUNTER (OUTPATIENT)
Facility: HOSPITAL | Age: 49
Discharge: HOME | End: 2024-06-12
Attending: ANESTHESIOLOGY | Admitting: ANESTHESIOLOGY
Payer: COMMERCIAL

## 2024-06-12 VITALS
DIASTOLIC BLOOD PRESSURE: 59 MMHG | HEIGHT: 62 IN | TEMPERATURE: 98 F | WEIGHT: 134 LBS | OXYGEN SATURATION: 100 % | BODY MASS INDEX: 24.66 KG/M2 | RESPIRATION RATE: 11 BRPM | HEART RATE: 69 BPM | SYSTOLIC BLOOD PRESSURE: 104 MMHG

## 2024-06-12 DIAGNOSIS — M54.12 CERVICAL RADICULOPATHY: ICD-10-CM

## 2024-06-12 PROCEDURE — 62321 NJX INTERLAMINAR CRV/THRC: CPT | Performed by: ANESTHESIOLOGY

## 2024-06-12 PROCEDURE — 25000003 PHARM REV CODE 250: Performed by: ANESTHESIOLOGY

## 2024-06-12 PROCEDURE — D9220A PRA ANESTHESIA: Mod: ,,, | Performed by: NURSE ANESTHETIST, CERTIFIED REGISTERED

## 2024-06-12 PROCEDURE — 25000003 PHARM REV CODE 250: Performed by: NURSE ANESTHETIST, CERTIFIED REGISTERED

## 2024-06-12 PROCEDURE — 37000008 HC ANESTHESIA 1ST 15 MINUTES: Performed by: ANESTHESIOLOGY

## 2024-06-12 PROCEDURE — 25500020 PHARM REV CODE 255: Performed by: ANESTHESIOLOGY

## 2024-06-12 PROCEDURE — 27000284 HC CANNULA NASAL: Performed by: NURSE ANESTHETIST, CERTIFIED REGISTERED

## 2024-06-12 PROCEDURE — 63600175 PHARM REV CODE 636 W HCPCS: Mod: JG | Performed by: ANESTHESIOLOGY

## 2024-06-12 RX ORDER — SODIUM CHLORIDE 9 MG/ML
500 INJECTION, SOLUTION INTRAVENOUS CONTINUOUS
Status: DISCONTINUED | OUTPATIENT
Start: 2024-06-12 | End: 2024-06-12 | Stop reason: HOSPADM

## 2024-06-12 RX ORDER — TRIAMCINOLONE ACETONIDE 40 MG/ML
INJECTION, SUSPENSION INTRA-ARTICULAR; INTRAMUSCULAR CODE/TRAUMA/SEDATION MEDICATION
Status: DISCONTINUED | OUTPATIENT
Start: 2024-06-12 | End: 2024-06-12 | Stop reason: HOSPADM

## 2024-06-12 RX ORDER — LIDOCAINE HYDROCHLORIDE 20 MG/ML
INJECTION, SOLUTION EPIDURAL; INFILTRATION; INTRACAUDAL; PERINEURAL
Status: DISCONTINUED | OUTPATIENT
Start: 2024-06-12 | End: 2024-06-12

## 2024-06-12 RX ORDER — PROPOFOL 10 MG/ML
VIAL (ML) INTRAVENOUS
Status: DISCONTINUED | OUTPATIENT
Start: 2024-06-12 | End: 2024-06-12

## 2024-06-12 RX ORDER — IOPAMIDOL 612 MG/ML
INJECTION, SOLUTION INTRATHECAL CODE/TRAUMA/SEDATION MEDICATION
Status: DISCONTINUED | OUTPATIENT
Start: 2024-06-12 | End: 2024-06-12 | Stop reason: HOSPADM

## 2024-06-12 RX ORDER — BUPIVACAINE HYDROCHLORIDE 2.5 MG/ML
INJECTION, SOLUTION INFILTRATION; PERINEURAL CODE/TRAUMA/SEDATION MEDICATION
Status: DISCONTINUED | OUTPATIENT
Start: 2024-06-12 | End: 2024-06-12 | Stop reason: HOSPADM

## 2024-06-12 RX ORDER — PREGABALIN 50 MG/1
50 CAPSULE ORAL 3 TIMES DAILY
COMMUNITY

## 2024-06-12 RX ADMIN — SODIUM CHLORIDE: 9 INJECTION, SOLUTION INTRAVENOUS at 09:06

## 2024-06-12 RX ADMIN — Medication 50 MG: at 09:06

## 2024-06-12 RX ADMIN — Medication 100 MG: at 09:06

## 2024-06-12 RX ADMIN — LIDOCAINE HYDROCHLORIDE 50 MG: 20 INJECTION, SOLUTION EPIDURAL; INFILTRATION; INTRACAUDAL; PERINEURAL at 09:06

## 2024-06-12 NOTE — OP NOTE
06/12/2024    Ebony Lucero 1975    PREOPERATIVE DIAGNOSIS:     Cervical Radiculopathy                                                              Neck Pain     POSTOPERATIVE DIAGNOSIS:  Cervical Radiculopathy                                                              Neck Pain     PROCEDURE:  Catheter Guided Cervical Epidural Steroid Injection under Fluoroscopic Guidance at C5-6  level.      SURGEON: Dr Claude Briceño  COMPLICATIONS: None  ANESTHESIA:  MAC  DRAINS AND PACKS:  None  BLOOD LOSS:  None     The patient was identified in the holding area.  The risks and benefits of the procedure were again explained to the patient and the patient agreed to proceed.  The patient was taken in stable condition to the procedure room and was placed in prone position on the C-Arm table.  All pressure points were checked and padded comfortably while the patient was awake.  Time out was completed.  Standard ASA monitors applied.  Anesthesia was initiated.  Patient was comfortable and the neck was then prepped and draped in usual sterile fashion. The skin wheal  using Bupivacaine 0.25% (2.5 mg/ml) 1cc was raised over the C7-T1 interspaces and an 18 gauge needle was advanced under fluoroscopic guidance into the epidural space with loss of resistance confirmed with air.  The stylet was removed and there was negative aspiration of heme and CSF.  A Versicath Catheter was advanced to the target level at  C 5-6  level. The patient then received a 2 cc allotment of Isovue M 300 contrast with excellent delineation within the epidural space.  After confirmation and appropriate placement of the cannula, the patient then received  Kenalog 40mg/ml 1ml with 2ccs of 0.25% Bupivacaine(2.5mg/ml) and 2ccs of preservative free Saline.  The needle was removed with tip intact.  There was adequate hemostasis at the conclusion of the procedure. The patient was taken in stable condition to the holding area and monitored for the appropriate  time of convalescence.  The patient tolerated the procedure well with no adverse events.      The patients preoperative pain score was  7/10.   The postoperative pain score is  /10.

## 2024-06-12 NOTE — DISCHARGE SUMMARY
Patient underwent  Catheter Guided Cervical Epidural Steroid Injection under Fluoroscopic Guidance at C5-6  level  procedure 06/12/2024. The pt will follow up in clinic. Discharged home. Discharge Dx: Cervical Radiculopathy

## 2024-06-12 NOTE — ANESTHESIA PREPROCEDURE EVALUATION
06/12/2024  Ebony Lucero is a 49 y.o., female.      Pre-op Assessment    I have reviewed the Patient Summary Reports.     I have reviewed the Nursing Notes. I have reviewed the NPO Status.   I have reviewed the Medications.     Review of Systems  Anesthesia Hx:  No problems with previous Anesthesia                    Physical Exam  General: Well nourished, Cooperative, Alert and Oriented    Airway:  Mallampati: II   Mouth Opening: Normal  TM Distance: Normal  Tongue: Normal  Neck ROM: Normal ROM    Dental:  Intact        Anesthesia Plan  Type of Anesthesia, risks & benefits discussed:    Anesthesia Type: Gen Natural Airway, MAC  Intra-op Monitoring Plan: Standard ASA Monitors  Post Op Pain Control Plan: multimodal analgesia  Induction:  IV  Informed Consent: Informed consent signed with the Patient and all parties understand the risks and agree with anesthesia plan.  All questions answered. Patient consented to blood products? Yes  ASA Score: 3  Day of Surgery Review of History & Physical: I have interviewed and examined the patient. I have reviewed the patient's H&P dated: There are no significant changes.     Ready For Surgery From Anesthesia Perspective.     .  Past Medical History:   Diagnosis Date    GERD (gastroesophageal reflux disease)     Sleep apnea     CPAP       Past Surgical History:   Procedure Laterality Date    BACK SURGERY  2018    CHOLECYSTECTOMY      EPIDURAL STEROID INJECTION INTO CERVICAL SPINE N/A 05/15/2024    Procedure: INJECTION, STEROID, SPINE, CERVICAL, EPIDURAL;  Surgeon: Claude Briceño MD;  Location: [facility];  Service: Pain Management;  Laterality: N/A;  C5-6 cath guided OTONIEL    HYSTERECTOMY         No family history on file.    Social History     Socioeconomic History    Marital status:    Tobacco Use    Smoking status: Never    Smokeless tobacco: Never    Substance and Sexual Activity    Alcohol use: Not Currently       Current Facility-Administered Medications   Medication Dose Route Frequency Provider Last Rate Last Admin    0.9%  NaCl infusion  500 mL Intravenous Continuous Claude Briceño MD           Review of patient's allergies indicates:  No Known Allergies There is no problem list on file for this patient.          08-Aug-2018 02:27

## 2024-06-12 NOTE — H&P
[facility] - Pain Management  Pain Management  H&P    Patient Name: Ebony Lucero  MRN: [id number]  Admission Date: 2024  Primary Care Provider: Deejay Mejía MD    Patient information was obtained from     Subjective:     Principal Problem:  MITESH HOLUGIN MD,P.A.  [address] [place] [id number]                      RE: Ebony Lucero      : 1975   Date of Service: 2024   Procedure Follow-Up 5/15/2024 Cath Guided OTONIEL @ C5-6 Pre 5 Post 0  Medication Refill   Existing Patient Follow up after procedure           Chief Complaint:   Neck pain described as constant; on today's visit the patient's pain is improved in nature from last visit,  Back pain burning in nature, constant, tingling; located in the thoracic region; aggravated by sitting, standing; relieved by heat, rest. Patient stated there is improvement of back pain since last visit..   Patient seated in exam room 10 with c/o back pain  Controlled Substance Prescription Agreement signed and reviewed. Verbalizes understanding. 3-22-24  [place] Prescription Monitoring Program data was reviewed for this patient for the past 12 calendar months to ascertain any current,or past use of scheduled medications.                                                                                    Opioid Risk Tool                                                                                 Wayne each box                           Item Score                        Item Score                                                                              that applies.                               if Female.                          if Male                    1. Family history of substance abuse                  Alcohol  (  )                                      1                                     3                                                                              Illegal Drugs (  )                               2                                      3                                                                              Prescription Drugs (  )                     4                                     4        2. Personal History of substance abuse          Alcohol  (  )                                      3                                      3                                                                             Illegal Drugs (  )                               4                                     4                                                                             Prescription Drugs (  )                     5                                      5     3. Age (Wayne box if 16-45)                                           (  )                                          1                                      1        4. History of Preadolescent Sexual Abuse                   (  )                                         3                                      0        5. Psychological Disease    Attention Deficit disorder  (  )                                         2                                       2                                                             or                                              Obsessive Compulsive                                                           disorder                                                               or                                                                                       Bipolar Schizophrenia                                                              Depression                        (  )                                         1                                        1        Total=1     Total Score Risk Category           Low risk 0-3         Moderate risk 4-7              High risk >8           History of Present Illness:   What part of the body? back   Pain level at present 4      03/22/2024-History of present illness  Eboyn Lucero  is a   50y/o female who presents for evaluation and management of chronic pain in her lower back and bilateral hips.   Subjective: Patient presents today for evaluation for pain problems. Pt reports that pain started has been chronic for about 6 years but has been ongoing for years after accident. She ahs treated with TPC but was ready for a change. After review of records, pts physical exam and discussion with the patient we will plan to continue her meds. We will send her to PT for back strengthening exercises. She will consider TFLESI L5-S1, Consider TESI and consider SCS.      04/23/2024-pt here for follow up after her new pt appt and reports that she would like to schedule procedure for her neck and arm pain. We have reviewed her Cervical spine MRI again with the pt and after discussion with the pt based on her physical exam, symptoms and her MRI we will plan to schedule her for a Cath guided OTONIEL @ C5-6 level for dx of cervical radiculopathy.    Due to the presence of cervical radicular symptoms, we have elected to proceed with cervical epidural steroid injections at the level confirmed by physical examination and accompanying studies. This is medically necessary to improve function, reduce pain and limitations, and to reduce the reliance on pain medications. Additional epidural steroid injections will be based on patient improvement. Cath guided OTONIEL @ C5-6 level (M54.12) - Cervical radiculopathy     Cervical MRI dated 08/24/2023C5-6: Bilateral facet arthropathy. Disc is desiccated and mildly narrowed. There is disc bulge with superimposed broad-based posterior disc herniation measuring 3 mm AP dimension beyond vertebral body margin. There is mild abutment of the ventral cord with minimal cord flattening and mild-moderate canal stenosis. Extension of broad-based disc herniation and associated uncinate hypertrophy in combination with the facet arthropathy results in moderate to severe bilateral foraminal stenosis,  slightly greater on the left.        5/15/2024 Cath Guided OTONIEL @ C5-6 Pre 5 Post 0     05/23/2024-pt here for a follow up after procedure and med. She reports that the injection did help with her pain >95% and activity by > 95%. She is ready to schedule another one. We will plan to schedule her for #2 Cath guided OTONIEL @  C5-6 level. We will continue her meds the same.    Due to the presence of cervical radicular symptoms, we have elected to proceed with cervical epidural steroid injections at the level confirmed by physical examination and accompanying studies. This is medically necessary to improve function, reduce pain and limitations, and to reduce the reliance on pain medications. Additional epidural steroid injections will be based on patient improvement. Cath guided OTONIEL @ C5-6 level #2 (M54.12) - Cervical radiculopathy     Cervical MRI dated 08/24/2023C5-6: Bilateral facet arthropathy. Disc is desiccated and mildly narrowed. There is disc bulge with superimposed broad-based posterior disc herniation measuring 3 mm AP dimension beyond vertebral body margin. There is mild abutment of the ventral cord with minimal cord flattening and mild-moderate canal stenosis. Extension of broad-based disc herniation and associated uncinate hypertrophy in combination with the facet arthropathy results in moderate to severe bilateral foraminal stenosis, slightly greater on the left.  The previous urine drug screen was evaluated  03/22/2024 New pt UDS and it was consistent.        We have previously discussed the new CDC guidelines for opioid prescribing practices. We have educated the patient about morphine milliequivalents. Patient has voiced understanding that medications may be decreased over the next several months if we do not find adequate pain control or increased function as result of medication. We have also discussed with the patient that more frequent follow-ups will be based on the amount of pain medications used  daily.         Nursing:   Is it helping? Yes  Physical Therapy  no Home Exercises  no New medical problems or surgeries  no New medications  no New allergies  no New antibiotics, fever, infection      Current Medications:   Lyrica Oral Capsule 50 MG (5/23/2024) Take 1 capsule three times a day for 30 day(s)  Baclofen Oral Tablet 10 MG (5/23/2024) Take 1 to 1and1/2 tablets every night at bedtime for 30 day(s)  oxyCODONE HCl ER Oral Tablet ER 12 Hour Abuse-Deterrent 20 MG (5/23/2024) Take 1 tablet every 12 hours for 30 day(s)  Percocet Oral Tablet  MG (5/23/2024) Take 1 tablet three times a day as needed for 30 day(s)      Previous Studies:  MRI; Findings  STUDY:  MRI thoracic spine  HISTORY:  Back pain right lower extremity pain for 6 years. Original injury 2018 related to a fall.  TECHNIQUE: Sagittal T1 weighted, T2 weighted and STIR images of the thoracic spine and axial T1 weighted and T2 weighted images of the thoracic spine are presented.    FINDINGS:   Coronal  view demonstrates moderate levoscoliosis of the thoracic spine. The height of the thoracic vertebral bodies is maintained and there is otherwise normal kyphotic curvature and alignment. There is some disc space narrowing and loss of disc signal consistent with disc desiccation discogenic change from the T5-6 through T9-10 levels. The remaining discs are normally hydrated. No obvious abnormal signal within the cord is seen.  T6-7: There is a prominent right paracentral disc herniation extending approximately 4 mm beyond the vertebral body margin and impinging on the right ventral aspect of the cord. There is flattening of the right ventral aspect of the cord. No central canal stenosis is seen. The neural canals remain patent superiorly.  T7-8: There is a left paracentral left lateral disc herniation extending approximately 3 mm beyond the vertebral body margin contributing to some narrowing of the left neural canal. No cord deformity is seen.  No central canal stenosis is seen. The right neural canal is patent.  T9-10: There is a large left paracentral left lateral disc herniation which extends approximately 6-7 mm beyond the vertebral body margin and extends both above and below the disc level impinging on the left ventral aspect of the cord causing flattening of the left ventral aspect of the cord and contributing to mild central canal stenosis. Disc contributes to left neural canal compromise. The right neural canal remains patent superiorly.    IMPRESSION:     1. Moderate levoscoliosis of the thoracic spine.  2. Prominent right paracentral disc herniation T6-7 with cord impingement.  3. Left paracentral left lateral disc herniation T7-8 contributing to left neural canal compromise.  4. Large left paracentral left lateral disc herniation T9-10 with cord impingement, central canal stenosis and left neural canal compromise.      Electronically Signed by AWILDA CALDERON at 14-Dec-2022 06:33:52 AM     Contrast Type and Amount: NO CONTRAST     EXAM:MRI cervical spine without contrast  HISTORY:  Cervical radiculopathy. Left-sided neck pain.  COMPARISON:  No prior exam of the cervical spine. Correlation made with MRI thoracic spine December 2022  TECHNIQUE:   Sagittal T1, T2, stir and axial T2 sequences of the cervical spine were performed without intravenous contrast.  FINDINGS:   Cervical vertebral alignment is normal. Vertebral bodies normal in height. Marrow signal pattern exhibited in the vertebrae is normal.  Imaged portions of the posterior fossa and craniocervical junction are normal.  C1-2: Alignment is normal. There is no canal narrowing.  C2-3: No disc herniation or canal stenosis. Neural foramina are patent.  C3-4: Uncinate hypertrophy and facet arthropathy mildly narrows right foraminal caliber. No disc herniation or central canal stenosis.  C4-5: Bilateral facet arthropathy.  The disc is desiccated and mildly bulging circumferentially approximately  2 mm. Disc bulge in combination with uncinate hypertrophy causes moderate right and mild left foraminal stenosis. Disc bulging mildly deforms the anterior thecal sac but there is no cord contact. Mild canal stenosis.  C5-6: Bilateral facet arthropathy. Disc is desiccated and mildly narrowed. There is disc bulge with superimposed broad-based posterior disc herniation measuring 3 mm AP dimension beyond vertebral body margin. There is mild abutment of the ventral cord with minimal cord flattening and mild-moderate canal stenosis. Extension of broad-based disc herniation and associated uncinate hypertrophy in combination with the facet arthropathy results in moderate to severe bilateral foraminal stenosis, slightly greater on the left.  C6-7: Bilateral facet arthropathy. Disc desiccation and disc bulge with superimposed broad-based disc herniation centrally and lateralizing to the left. This disc herniation measures up to 3.4 mm beyond expected vertebral body margin. There is effacement of CSF anterior to the spinal cord with mild canal stenosis. Asymmetric extension of disc herniation into the left neural foramen causes severe left foraminal stenosis.  C7-T1: No disc herniation or significant spinal canal or foraminal narrowing. Upper thoracic levels are unremarkable.  No cord signal alteration. No concerning abnormality exhibited in the immediate paraspinal soft tissues.  IMPRESSION:     Broad-based posterior disc herniation centrally and lateralizing to the left at C6-7 effaces CSF anterior to the spinal cord mild canal stenosis and extends into and causes severe narrowing of the left neural foramen in combination with uncinate hypertrophy and facet arthropathy.  Disc bulge with superimposed broad-based posterior disc herniation and vertebral osteophyte complex at C5-6 causes mild cord abutment and mild to moderate canal stenosis. In combination with uncinate hypertrophy and facet arthropathy there is severe  bilateral foraminal narrowing.  No cord signal alteration.    Electronically Signed by VALERIA ERIC at 24-Aug-2023 11:51:10 AM     Contrast Type and Amount: NO CONTRAST  <Addendum Signed by VALERIA ERIC at 22-Dec-2023 10:29:54 AM  No unanticipated enhancement detected on postcontrast images.  Addendum End>  EXAM: MRI lumbar spine with and without contrast  HISTORY:  Lumbar radiculopathy. Back injury from a fall with pain in the upper left side  COMPARISON:  No previous lumbar spine exam. Correlation is made with MRI thoracic spine December 2022   TECHNIQUE:   Sagittal T1, T2, stir and axial T1 and T2 weighted sequences of the lumbar spine were performed before intravenous contrast. Subsequently intravenous contrast was administered and axial and sagittal T1 weighted sequences were performed.  FINDINGS:    There are postoperative changes of anterior and posterior fusion at L5-S1 across grade 1 spondylolisthesis. Bilateral pedicle screws and rods and anterior and interbody fusion hardware are present related to the fusion. There is no significant surrounding marrow or soft tissue edema to strongly indicate hardware complication. Above this level alignment of lumbar vertebrae is normal. Vertebral bodies are normal in height. Lower cord is normal in signal. Conus terminates at T12-L1.  Mild desiccation of disc at L4-5. Minimal osteophytosis and very subtle discogenic Modic type 2 endplate signal changes are present in the L4 and L5 vertebral bodies.  L1-2: No disc herniation or canal or foraminal narrowing.  L2-3: No disc herniation or canal or foraminal stenosis.  L3-4: No disc herniation or canal or foraminal stenosis.  L4-5: Disc is desiccated and there is 2 mm of circumferential bulging. No significant nerve root displacement or spinal canal stenosis. Neural foramina are patent.  L5-S1: Postoperative changes of fusion across grade 1 spondylolisthesis at this level. There is no residual disc. No spinal canal or  foraminal stenosis.  Upper sacrum is unremarkable. No concerning abnormality exhibited in the immediate paraspinal soft tissues.  IMPRESSION:     Postoperative changes of fusion at L5-S1. No evidence of hardware complication or canal stenosis at the operative level. There is underlying grade 1 spondylolisthesis at this level.  Circumferential disc bulging at L4-5 without canal stenosis.  No acute fracture.     Electronically Signed by VALERIA ERIC at 22-Dec-2023 10:15:52 AM  Amended by VALERIA ERIC at 22-Dec-2023 10:29:54 AM  Contrast Type and Amount: 13ML OF 15ML CLARISCAN GIVEN  EXAM: MRI thoracic spine without contrast  HISTORY:  Thoracic radiculopathy. Upper to mid back pain with recent worsening extending to the left side with some weakness.  COMPARISON:  MRI thoracic spine December 2022   TECHNIQUE:   Sagittal T1, T2, stir and axial T1 and T2 weighted sequences of the thoracic spine were performed without intravenous contrast.  FINDINGS:    Mildly accentuated thoracic kyphosis.  Moderate levoscoliosis of the thoracic spine also again exhibited.  The vertebral bodies normal in height.  There is multilevel disc desiccation and disc narrowing similar in pattern to the degenerative changes present on prior exam predominantly involving T5 through T10 levels.  Throughout these levels there are marginal osteophytes from anterior vertebral bodies .  There has been development of adjacent endplate defects in inferior T9 and superior T10 vertebral bodies with surrounding edema suggesting Schmorl's nodes and discogenic type 1 endplate signal change.  Mild chronic Modic type 2 predominant endplate signal changes are also seen in adjacent vertebral bodies at T6-7 and T7-8.  T1-2:  1 mm disc bulge.  No cord contact or canal narrowing.  T2-3:  1 mm disc bulge minimally deforms the anterior thecal sac lateralizing to the left. No cord contact or canal stenosis. Caliber of left neural foramen appears mildly narrowed from  disc bulging and facet arthropathy.  Similar findings were present previously.  T3-4:  No disc herniation or canal narrowing.  T4-5:  No disc herniation or canal narrowing.  T5-6:  Disc is narrowed anteriorly. However there is no significant posterior disc herniation or canal narrowing.  T6-7:  Chronic degeneration of the disc which is moderately desiccated and narrowed.  Posteriorly there is a large right paracentral disc herniation and vertebral osteophyte complex that measures 4 mm AP dimension deforming right aspect of the spinal cord with moderate indentation of the right ventral aspect of the cord that is very similar to that seen previously.  Adequate CSF remains present posterior to the cord without significant central canal stenosis.  Neural foramina are patent.  T7-8:  3 mm AP dimension left paracentral disc herniation deforms left anterior aspect of the thecal sac. This is unchanged.  No significant spinal canal narrowing.    T8-9:  1 mm of disc bulging.  No cord contact or canal narrowing.  T9-10:  Disc is desiccated and significantly narrowed.  There is a large left paracentral predominant disc herniation and vertebral osteophyte complex measuring 6 mm beyond vertebral body margin in AP dimension and approximately 10-11 mm craniocaudal extent moderately deforming the left aspect of the spinal cord displacing the cord to the right.  There is canal stenosis and an element of narrowing of the proximal left neural foramen.  No significant change compared to prior exam.  T10-11:  No disc herniation or canal narrowing.  T11-12:  No disc herniation or canal narrowing.  T12-L1:  No disc herniation or canal narrowing.  No areas of cord signal alteration despite the significant cord deformity again exhibited at T6-7 and T9-10 levels.  No concerning abnormality exhibited in the immediate paraspinal soft tissues.  IMPRESSION:     Levoscoliosis of the thoracic spine with multilevel disc degeneration and  redemonstration of large chronic disc herniations causing significant cord deformity at T9-10 and T6-7 that are very similar to that seen previously.  Left paracentral disc herniation at T7-8 also appears similar in size compared to prior exam measuring approximately 3 mm AP dimension deforming left anterior aspect of the thecal sac.  There has been development of endplate defects typical for Schmorl's nodes in T9 and T10 vertebral bodies with rim of surrounding marrow edema about the Schmorl's nodes.  The appearance of the thoracic spine has otherwise not significantly changed.  No cord signal alteration.      Electronically Signed by VALERIA ERIC at 15-Feb-2024 03:02:05 PM     Contrast Type and Amount: NO CONTRAST GIVEN   Surgical History:   Hysterectomy; Spinal Surgery   Uterine Suspension  Cholecystectomy  ALIF L5-S1      Social History:      Smoking Status: Never smoker; Last Reviewed: 05/23/2024                        Denies alcohol use  No drug use  Occupation: works full-time  Marital status: Single               Family History:   There is a family history of  Hypertension, Cancer  Heart Disease  .      Review of Systems:   General:  Patient denies  sweats, fatigue, fever, chills, recent change in weight.  Eyes:  Patient denies  blurred vision, glaucoma.  Ears, Nose and Throat:  Patient denies  hearing loss, ringing in the ears, sinus congestion, difficulty swallowing.  Cardiovascular:  Patient denies  arrhythmia, chest pain, palpitations.  Respiratory:  Patient denies  requiring oxygen, shortness of breath, cough, wheezing.  Gastrointestinal:  Patient denies  ulcer, heartburn, nausea, vomiting, blood in stool, diarrhea, constipation, hemorrhoids.  Genitourinary:  Patient denies  hematuria, kidney stones, urinary frequency, polyuria, urinary hesitancy, dysuria, burning on urination, prostate problems, menstrual complications, painful intercourse.  Endocrine:  Patient denies  polydipsia, temperature  intolerance, thyroid problems, loss of hair from head or body.  Hematologic:  Patient denies  bleeding tendencies, easy bruising tendency, bleeding disorders, bleeding gums.  Musculoskeletal:   Patient admits to   joint pain, joint stiffness, muscle cramps.  Patient denies  fractures, walking aids.  Neurologic  Patient denies  dizziness, seizures, headache, not confused or disoriented, memory lapses or loss, paralysis, difficulty walking.  Psychologic:  Patient denies  anxiety, panic attacks, mood disorder, emotional problems, depression, sleep disturbances, suicidal thoughts, suicide attempt(s).  Skin:  Patient denies  pruritus, jaundice, skin rash.       DEPRESSION SCREENING:   Not at all the patient reports little interest or pleasure in doing things.  Not at all the patient reports feeling down, depressed, or hopeless.  Date Depression Screening Last Done: 05/23/2024   PHQ-2 Score 0; PHQ-9 Score incomplete      Vital Signs:   Weight 139 lbs; Height 5 ft 2 in; BMI 25.4   05/23/2024 9:06 AM (CST)  Respiration Rate 18; Pulse Rate 80 bpm; Blood Pressure 121 / 79 mm/Hg; Pain Level: 4         Physical Examination:   Cranial Nerves II-XII grossly intact.  No apparent distress.  No somnolence or slurred speech.     Lumbar spine  patient has pain with flexion and extension lateral rotation  Lumbar facets are tender to palpation  No focal neurologic deficits noted      Back Motion:   Lumbar / lumbosacral spine abnormal.         Additional Physical Findings:  General abnormal,   Awake, alert, oriented to time, place and person, well developed, pleasant, uncomfortable, seated  Musculoskeletal abnormal,   Thoracic spine abnormal, thoracolumbar spine abnormal  Posture normal  Neurologic normal neurologic exam,   Cranial nerves, motor function  Gait and stance normal  Skin normal skin exam,   Dry, warm       Toxicology Report   Toxicology was performed.   Reason for Toxicology:  A presumptive urine drug screen was done today  to rapidly obtain and integrate results into clinical assessment and decision-making for ongoing safe prescribing of controlled substances.                                       Assessment:   (M54.50) - Low back pain  (M47.817) - Lumbosacral spondylosis without myelopathy  (M54.6) - Thoracic back pain  (M54.2) - Neck pain  (M40.209) - Kyphosis  (Z98.890) - Hx of lumbosacral spine surgery  (M54.12) - Cervical radiculopathy  (G89.29) - Other chronic pain  (Z79.891) - Long term (current) use of opiate analgesic      Plan:   Physical Activity Counseling   Nutrition Counseling      Analgesia: Patient reports adequate levels on analgesia.  Activity: Patient encouraged to exercise and continue normal activities.  Adverse Reactions:  No adverse reactions  Aberrant Behavior: No aberrant behavior noted.  appropriate and UDS consistent  Affect: Normal mood.  Adjuncts:        Functional Goals: Patient will have decreased pain with current medications and have increased function/activities.  Progress toward functioning goals: Pt will maintain/and or have increase in function and activity with current meds.  Reason for initiating/continuing opioids: Improve function, reduce pain, reduce use of er/urgent care and minimize organ toxicity from analgesics.  Continue medication doses as currently prescribed:  Other treatment modalities/referrals recommended:  Risks and benefits of test or referrals discussed:  Urine Drug screen ordered:  Contract/agreement signed or updated using lay language.  Follow up interval:  1-3 months  Follow up with Dr. Briceño.        1. The patient has chronic nonmalignant pain with pathology likely contributing to the symptoms and based on the improvement of pain and function in response to opioid medications; lack of serious side effects and limited identifiable aberrant behavior; I believe it is reasonable to prescribe opioid therapy which requires a greater than 72 hours supply of opioid therapy.  Patient was educated on the potential dependency issues associated with long-term opioid use; as well as the decreasing efficacy with prolonged use. Patient has been advised of the risk/benefits and side effect and how to utilize each medication. Patient was informed that and deviation from therapy protocol could lead to discontinuation of opioids. Patient was given the opportunity to begin weaning off opioids. Patient was given and opportunity to ask and have questions answered regarding the continuation of opioid therapy. At the time patient is at goal in terms of the pain treatment plan. Patients medications have been reviewed with patient. We will follow up with patient to review effectiveness of medication, and to discuss any potential side effects. The morphine milliequivalents (MME) have been calculated for this patient. According to the CDC guidelines, patients with an MME less than 50 should be monitored for pain and function frequently. Therefore this patient will be monitored every 1-3 months via office visits,  evaluations, and urinary drug screens.     2. Follow up in 2 weeks after procedure.  Patient may contact office for earlier follow-up should an issue arise.     3. Due to the presence of cervical radicular symptoms, we have elected to proceed with cervical epidural steroid injections at the level confirmed by physical examination and accompanying studies. This is medically necessary to improve function, reduce pain and limitations, and to reduce the reliance on pain medications. Additional epidural steroid injections will be based on patient improvement. Cath guided OTONIEL @ C5-6 level #2 (M54.12) - Cervical radiculopathy     Cervical MRI dated 08/24/2023C5-6: Bilateral facet arthropathy. Disc is desiccated and mildly narrowed. There is disc bulge with superimposed broad-based posterior disc herniation measuring 3 mm AP dimension beyond vertebral body margin. There is mild abutment of the ventral  cord with minimal cord flattening and mild-moderate canal stenosis. Extension of broad-based disc herniation and associated uncinate hypertrophy in combination with the facet arthropathy results in moderate to severe bilateral foraminal stenosis, slightly greater on the left.     4. Procedure instructions given to pt who verbalized understanding. Procedure sheet given to the patient after verbally voicing understanding of the sheet concerning blood thinners, NPO status, and importance of a .     5. Monitored Anesthesia Care medical necessity authorization request:       Monitor anesthesia request is medically indicated for the scheduled ___Cath guided OTONIEL Cervical _____procedure due to:     - needle phobia and anxiety, placing the patient at risk during the provided service._____  - patient has a BMI greater than 45 ____  - patient has severe sleep apnea for which BiPAP and oxygen are needed while sleeping._____  - patient is unable to follow simple commands due to mental state.____  - patient has an ASA class greater than 3 and requires constant presence of an anesthesiologist/CRNA during the procedure.____  - patient has severe problems with muscles and muscle spasticity that makes it hard to lie still. ____  - patient suffers from chronic pain and is unable to function due to diminished ADL's.____  - patient is dependent on opioids or sedatives.____  - Other ____     NARCOTIC STATEMENT  Patient is taking the narcotic pain medications as prescribed. Refill is being given because of the benefit to the patient in regards to the pain. Patient has agreed not to abuse of medication and not to take it more than what is prescribed. The nature of the drug including the potential for addiction and dependency and abuse was also discussed with the patient. Patient has developed physical dependency for the narcotic pain medication for his pain relief.  Patient has also developed tolerance to the sedative effect of the  narcotic pain medications.  Patient has decided to continue with these medications despite potential for addiction as described by this office.  This was stressed to the patient that it is the patient's responsibility to secure the narcotic medication and in any event of loss for any reason whatsoever,  there will be no refill before the next due date. Patient also understands that they are not supposed to drive or work on machinery while taking these medications.  Also explained to the patient that in the event of traffic citation, the presence of this drug in  bloodstream may result in DUI.  Patient has been advised not to drink alcohol while taking this medication.  Patient has verbalized understanding of our office policy and has signed a contract with us in this regard.        Compliance Statement:  Documented by Joan Ryan RN acting as a scribe for Claude Briceño M.D. The note accurately reflects work and decisions performed by me.      Prescriptions Written Today:  Lyrica Oral Capsule 50 MG  Take 1 capsule three times a day for 30 day(s)  Refills: 2  Rx quantity: 90,  Baclofen Oral Tablet 10 MG  Take 1 to 1and1/2 tablets every night at bedtime for 30 day(s)  Refills: 1  Rx quantity: 30,  oxyCODONE HCl ER Oral Tablet ER 12 Hour Abuse-Deterrent 20 MG  Take 1 tablet every 12 hours for 30 day(s)  Refills: No Refills  Rx quantity: 60  Take 1 tablet every 12 hours for 30 day(s)  Refills: No Refills  Rx quantity: 60,  Percocet Oral Tablet  MG  Take 1 tablet three times a day as needed for 30 day(s)  Refills: No Refills  Rx quantity: 90                 Claude Briceño MD          Chief Complaint:      HPI:     Assessment/Plan:         Claude Briceño MD  Pain Management  [facility] - Pain Management

## 2024-06-12 NOTE — TRANSFER OF CARE
"Anesthesia Transfer of Care Note    Patient: Ebony Lucero    Procedure(s) Performed: Procedure(s) (LRB):  INJECTION, STEROID, SPINE, CERVICAL, EPIDURAL (N/A)    Patient location: Other:    Anesthesia Type: MAC    Transport from OR: Transported from OR on room air with adequate spontaneous ventilation    Post pain: adequate analgesia    Post assessment: no apparent anesthetic complications    Post vital signs: stable    Level of consciousness: responds to stimulation    Nausea/Vomiting: no nausea/vomiting    Complications: none    Transfer of care protocol was followed      Last vitals: Visit Vitals  /76   Pulse 63   Temp 36.7 °C (98 °F) (Oral)   Resp 13   Ht 5' 2" (1.575 m)   Wt 60.8 kg (134 lb)   SpO2 96%   BMI 24.51 kg/m²     "

## 2024-06-12 NOTE — ANESTHESIA POSTPROCEDURE EVALUATION
Anesthesia Post Evaluation    Patient: Ebony Lucero    Procedure(s) Performed: Procedure(s) (LRB):  INJECTION, STEROID, SPINE, CERVICAL, EPIDURAL (N/A)    Final Anesthesia Type: MAC      Patient participation: Yes- Able to Participate  Level of consciousness: awake and alert  Post-procedure vital signs: reviewed and stable  Pain management: adequate  Airway patency: patent    PONV status at discharge: No PONV  Anesthetic complications: no      Cardiovascular status: blood pressure returned to baseline, hemodynamically stable and stable  Respiratory status: unassisted  Hydration status: euvolemic  Follow-up not needed.  Comments: See nursing note for post op pain/taylor score.              Vitals Value Taken Time   /59 06/12/24 1015   Temp 36.7 °C (98 °F) 06/12/24 0943   Pulse 69 06/12/24 1015   Resp 11 06/12/24 1015   SpO2 100 % 06/12/24 1015         Event Time   Out of Recovery 10:15:00         Pain/Taylor Score: Taylor Score: 10 (6/12/2024 10:15 AM)

## 2024-06-12 NOTE — PLAN OF CARE
Plan:  D/c pt via wheelchair at 1020  Informed pt if does not void in 8 hours to go to ER. Notify if redness, drainage, from injection site or fever over next 3-4 days. Rest and drink plenty of fluids for the remainder of the day. No lifting over 5 lbs. For the remainder of the day. Continue regular medications as prescribed. May take pain medications as prescribed.     Pain improved 57%  Pre-procedure pain: 7  Post-procedure pain: 3

## 2024-12-18 ENCOUNTER — ANESTHESIA EVENT (OUTPATIENT)
Dept: PAIN MEDICINE | Facility: HOSPITAL | Age: 49
End: 2024-12-18
Payer: COMMERCIAL

## 2024-12-18 ENCOUNTER — HOSPITAL ENCOUNTER (OUTPATIENT)
Facility: HOSPITAL | Age: 49
Discharge: HOME | End: 2024-12-18
Attending: ANESTHESIOLOGY | Admitting: ANESTHESIOLOGY
Payer: COMMERCIAL

## 2024-12-18 ENCOUNTER — ANESTHESIA (OUTPATIENT)
Dept: PAIN MEDICINE | Facility: HOSPITAL | Age: 49
End: 2024-12-18
Payer: COMMERCIAL

## 2024-12-18 VITALS
DIASTOLIC BLOOD PRESSURE: 67 MMHG | RESPIRATION RATE: 10 BRPM | WEIGHT: 128 LBS | HEIGHT: 62 IN | HEART RATE: 75 BPM | BODY MASS INDEX: 23.55 KG/M2 | OXYGEN SATURATION: 98 % | TEMPERATURE: 98 F | SYSTOLIC BLOOD PRESSURE: 97 MMHG

## 2024-12-18 DIAGNOSIS — M54.12 CERVICAL RADICULOPATHY: ICD-10-CM

## 2024-12-18 PROCEDURE — 62321 NJX INTERLAMINAR CRV/THRC: CPT | Performed by: ANESTHESIOLOGY

## 2024-12-18 PROCEDURE — 37000008 HC ANESTHESIA 1ST 15 MINUTES: Performed by: ANESTHESIOLOGY

## 2024-12-18 PROCEDURE — 63600175 PHARM REV CODE 636 W HCPCS: Mod: JG | Performed by: ANESTHESIOLOGY

## 2024-12-18 PROCEDURE — 25500020 PHARM REV CODE 255: Performed by: ANESTHESIOLOGY

## 2024-12-18 PROCEDURE — 63600175 PHARM REV CODE 636 W HCPCS: Performed by: NURSE ANESTHETIST, CERTIFIED REGISTERED

## 2024-12-18 RX ORDER — IOPAMIDOL 612 MG/ML
INJECTION, SOLUTION INTRATHECAL CODE/TRAUMA/SEDATION MEDICATION
Status: DISCONTINUED | OUTPATIENT
Start: 2024-12-18 | End: 2024-12-18 | Stop reason: HOSPADM

## 2024-12-18 RX ORDER — LIDOCAINE HYDROCHLORIDE 20 MG/ML
INJECTION, SOLUTION EPIDURAL; INFILTRATION; INTRACAUDAL; PERINEURAL
Status: DISCONTINUED | OUTPATIENT
Start: 2024-12-18 | End: 2024-12-18

## 2024-12-18 RX ORDER — PROPOFOL 10 MG/ML
INJECTION, EMULSION INTRAVENOUS
Status: DISCONTINUED | OUTPATIENT
Start: 2024-12-18 | End: 2024-12-18

## 2024-12-18 RX ORDER — BUPIVACAINE HYDROCHLORIDE 2.5 MG/ML
INJECTION, SOLUTION INFILTRATION; PERINEURAL CODE/TRAUMA/SEDATION MEDICATION
Status: DISCONTINUED | OUTPATIENT
Start: 2024-12-18 | End: 2024-12-18 | Stop reason: HOSPADM

## 2024-12-18 RX ORDER — TRIAMCINOLONE ACETONIDE 40 MG/ML
INJECTION, SUSPENSION INTRA-ARTICULAR; INTRAMUSCULAR CODE/TRAUMA/SEDATION MEDICATION
Status: DISCONTINUED | OUTPATIENT
Start: 2024-12-18 | End: 2024-12-18 | Stop reason: HOSPADM

## 2024-12-18 RX ORDER — SODIUM CHLORIDE 9 MG/ML
500 INJECTION, SOLUTION INTRAVENOUS CONTINUOUS
Status: DISCONTINUED | OUTPATIENT
Start: 2024-12-18 | End: 2024-12-18 | Stop reason: HOSPADM

## 2024-12-18 RX ADMIN — LIDOCAINE HYDROCHLORIDE 70 MG: 20 INJECTION, SOLUTION EPIDURAL; INFILTRATION; INTRACAUDAL; PERINEURAL at 03:12

## 2024-12-18 RX ADMIN — PROPOFOL 40 MG: 10 INJECTION, EMULSION INTRAVENOUS at 03:12

## 2024-12-18 RX ADMIN — PROPOFOL 70 MG: 10 INJECTION, EMULSION INTRAVENOUS at 03:12

## 2024-12-18 NOTE — DISCHARGE SUMMARY
Patient underwent Catheter Guided Cervical Epidural Steroid Injection under Fluoroscopic Guidance at C 5-6 level procedure 12/18/2024. The pt will follow up in clinic. Discharged home. Discharge Dx: Cervical Radiculopathy

## 2024-12-18 NOTE — PLAN OF CARE
Plan:  D/c pt via wheelchair at 1600  Informed pt if does not void in 8 hours to go to ER. Notify if redness, drainage, from injection site or fever over next 3-4 days. Rest and drink plenty of fluids for the remainder of the day. No lifting over 5 lbs. For the remainder of the day. Continue regular medications as prescribed. May take pain medications as prescribed.     Pain 6 on admissions  Pain 4 on discharge

## 2024-12-18 NOTE — OP NOTE
PREOPERATIVE DIAGNOSIS:     Cervical Radiculopathy                                                              Neck Pain     POSTOPERATIVE DIAGNOSIS:  Cervical Radiculopathy                                                              Neck Pain     PROCEDURE:  Catheter Guided Cervical Epidural Steroid Injection under Fluoroscopic Guidance at C 5-6 level.      SURGEON: Dr Claude Briceño  COMPLICATIONS: None  ANESTHESIA:  MAC  DRAINS AND PACKS:  None  BLOOD LOSS:  None     The patient was identified in the holding area.  The risks and benefits of the procedure were again explained to the patient and the patient agreed to proceed.  The patient was taken in stable condition to the procedure room and was placed in prone position on the C-Arm table.  All pressure points were checked and padded comfortably while the patient was awake.  Time out was completed.  Standard ASA monitors applied.  Anesthesia was initiated.  Patient was comfortable and the neck was then prepped and draped in usual sterile fashion. The skin wheal  using Bupivacaine 0.25% (2.5 mg/ml) 1cc was raised over the C7-T1 interspaces and an 18 gauge needle was advanced under fluoroscopic guidance into the epidural space with loss of resistance confirmed with air.  The stylet was removed and there was negative aspiration of heme and CSF.  A Versicath Catheter was advanced to the target level at  C 5-6  level. The patient then received a 2 cc allotment of Isovue M 300 contrast with excellent delineation within the epidural space.  After confirmation and appropriate placement of the cannula, the patient then received  Kenalog 40mg/ml 1ml with 2ccs of 0.25% Bupivacaine(2.5mg/ml) and 2ccs of preservative free Saline.  The needle was removed with tip intact.  There was adequate hemostasis at the conclusion of the procedure. The patient was taken in stable condition to the holding area and monitored for the appropriate time of convalescence.  The patient  tolerated the procedure well with no adverse events.      The patients preoperative pain score was  6/10.   The postoperative pain score is  /10.

## 2024-12-18 NOTE — TRANSFER OF CARE
"Anesthesia Transfer of Care Note    Patient: Ebony Lucero    Procedure(s) Performed: Procedure(s) (LRB):  INJECTION, STEROID, SPINE, CERVICAL, EPIDURAL (N/A)    Patient location: PACU    Anesthesia Type: MAC    Transport from OR: Transported from OR on room air with adequate spontaneous ventilation    Post pain: adequate analgesia    Post assessment: no apparent anesthetic complications and tolerated procedure well    Post vital signs: stable    Level of consciousness: alert and responds to stimulation    Nausea/Vomiting: no nausea/vomiting    Complications: none    Transfer of care protocol was followed      Last vitals: Visit Vitals  BP (!) 95/55   Pulse 73   Temp 36.8 °C (98.3 °F) (Oral)   Resp 12   Ht 5' 2" (1.575 m)   Wt 58.1 kg (128 lb)   SpO2 97%   BMI 23.41 kg/m²     "

## 2024-12-18 NOTE — H&P
[facility] - Pain Management  Pain Management  H&P    Patient Name: Ebony Lucero  MRN: [id number]  Admission Date: 2024  Primary Care Provider: Deejay Mejía MD    Patient information was obtained from     Subjective:     Principal Problem:     MITESH HOLGUIN MD,P.A.  [address] [place] [id number]                      RE: Ebony Lucero      : 1975   Date of Service: 2024   Medication Refill   Existing Patient 2 month follow up           Chief Complaint:   Neck pain described as constant; on today's visit the patient's pain is unchanged in nature from last visit,  Back pain burning in nature, constant, tingling; located in the thoracic region; aggravated by sitting, standing; relieved by heat, rest. Patient stated there is no improvement of back pain since last visit..   Patient seated in exam room 4 with c/o back pain  Controlled Substance Prescription Agreement signed and reviewed. Verbalizes understanding. 3-22-24  [place] Prescription Monitoring Program data was reviewed for this patient for the past 12 calendar months to ascertain any current,or past use of scheduled medications.                                                                                    Opioid Risk Tool                                                                                 Wayne each box                           Item Score                        Item Score                                                                              that applies.                               if Female.                          if Male                    1. Family history of substance abuse                  Alcohol  (  )                                      1                                     3                                                                              Illegal Drugs (  )                               2                                     3                                                                               Prescription Drugs (  )                     4                                     4        2. Personal History of substance abuse          Alcohol  (  )                                      3                                      3                                                                             Illegal Drugs (  )                               4                                     4                                                                             Prescription Drugs (  )                     5                                      5     3. Age (Wayne box if 16-45)                                           (  )                                          1                                      1        4. History of Preadolescent Sexual Abuse                   (  )                                         3                                      0        5. Psychological Disease    Attention Deficit disorder  (  )                                         2                                       2                                                             or                                              Obsessive Compulsive                                                           disorder                                                               or                                                                                       Bipolar Schizophrenia                                                              Depression                        (  )                                         1                                        1        Total=1     Total Score Risk Category           Low risk 0-3         Moderate risk 4-7              High risk >8           History of Present Illness:   What part of the body? back   Pain level at present 8      03/22/2024-History of present illness  Ebony Lucero  is a  48y/o female who presents for evaluation and management of chronic  pain in her lower back and bilateral hips.   Subjective: Patient presents today for evaluation for pain problems. Pt reports that pain started has been chronic for about 6 years but has been ongoing for years after accident. She ahs treated with TPC but was ready for a change. After review of records, pts physical exam and discussion with the patient we will plan to continue her meds. We will send her to PT for back strengthening exercises. She will consider TFLESI L5-S1, Consider TESI and consider SCS.      04/23/2024-pt here for follow up after her new pt appt and reports that she would like to schedule procedure for her neck and arm pain. We have reviewed her Cervical spine MRI again with the pt and after discussion with the pt based on her physical exam, symptoms and her MRI we will plan to schedule her for a Cath guided OTONIEL @ C5-6 level for dx of cervical radiculopathy.    Due to the presence of cervical radicular symptoms, we have elected to proceed with cervical epidural steroid injections at the level confirmed by physical examination and accompanying studies. This is medically necessary to improve function, reduce pain and limitations, and to reduce the reliance on pain medications. Additional epidural steroid injections will be based on patient improvement. Cath guided OTONIEL @ C5-6 level (M54.12) - Cervical radiculopathy     Cervical MRI dated 08/24/2023C5-6: Bilateral facet arthropathy. Disc is desiccated and mildly narrowed. There is disc bulge with superimposed broad-based posterior disc herniation measuring 3 mm AP dimension beyond vertebral body margin. There is mild abutment of the ventral cord with minimal cord flattening and mild-moderate canal stenosis. Extension of broad-based disc herniation and associated uncinate hypertrophy in combination with the facet arthropathy results in moderate to severe bilateral foraminal stenosis, slightly greater on the left.        5/15/2024 Cath Guided OTONIEL @ C5-6  Pre 5 Post 0     05/23/2024-pt here for a follow up after procedure and med. She reports that the injection did help with her pain >95% and activity by > 95%. She is ready to schedule another one. We will plan to schedule her for #2 Cath guided OTONIEL @  C5-6 level. We will continue her meds the same.    Due to the presence of cervical radicular symptoms, we have elected to proceed with cervical epidural steroid injections at the level confirmed by physical examination and accompanying studies. This is medically necessary to improve function, reduce pain and limitations, and to reduce the reliance on pain medications. Additional epidural steroid injections will be based on patient improvement. Cath guided OTONIEL @ C5-6 level #2 (M54.12) - Cervical radiculopathy     Cervical MRI dated 08/24/2023C5-6: Bilateral facet arthropathy. Disc is desiccated and mildly narrowed. There is disc bulge with superimposed broad-based posterior disc herniation measuring 3 mm AP dimension beyond vertebral body margin. There is mild abutment of the ventral cord with minimal cord flattening and mild-moderate canal stenosis. Extension of broad-based disc herniation and associated uncinate hypertrophy in combination with the facet arthropathy results in moderate to severe bilateral foraminal stenosis, slightly greater on the left.  The previous urine drug screen was evaluated  03/22/2024 New pt UDS and it was consistent.     6/12/2024 Cath Guided OTONIEL @ C5-6 Pre 7 Post 3     07/15/2024-pt here for a follow up after procedure and meds. She reports that she would like to hold on the next one. We will continue meds and see her back in 2 mo.   We have previously discussed the new CDC guidelines for opioid prescribing practices. We have educated the patient about morphine milliequivalents. Patient has voiced understanding that medications may be decreased over the next several months if we do not find adequate pain control or increased function as  result of medication. We have also discussed with the patient that more frequent follow-ups will be based on the amount of pain medications used daily.     09/12/2024-Current Treatment: Medications and procedures. There are no new significant side effects or excessive drowsiness from medications.  Patient presents today for follow-up evaluation for pain problems. Patient has no new complaints today, and presents for medical management. There is no abuse or diversion of the patient's medications. The patient is tolerating the current regimen well with no adverse events, side effects, or untoward complications. Activities are tolerated well and function is maintained with current pain medications, UDS reviewed. Pt defers procedures @ this time. Will plan to continue current medications and follow up in 2 months. Finished therapy at [facility].      11/14/2024-pt here for a follow up for meds and reports that her neck and arm pain is worse and she would like to get an injection for her pain. After review of her records and based on her physical exam and symptoms of cervical radiculopathy we will plan to set her up for Cath guided OTONIEL @ C5-6 level. Due to the presence of cervical radicular symptoms, we have elected to proceed with cervical epidural steroid injections at the level confirmed by physical examination and accompanying studies. This is medically necessary to improve function, reduce pain and limitations, and to reduce the reliance on pain medications. Additional epidural steroid injections will be based on patient improvement. Cath guided OTONIEL @ C5-6 level #2 (M54.12) - Cervical radiculopathy     Cervical MRI dated 08/24/2023C5-6: Bilateral facet arthropathy. Disc is desiccated and mildly narrowed. There is disc bulge with superimposed broad-based posterior disc herniation measuring 3 mm AP dimension beyond vertebral body margin. There is mild abutment of the ventral cord with minimal cord flattening and  mild-moderate canal stenosis. Extension of broad-based disc herniation and associated uncinate hypertrophy in combination with the facet arthropathy results in moderate to severe bilateral foraminal stenosis, slightly greater on the left.        The previous urine drug screen was evaluated 07/15/2024 and it was consistent. A presumptive urine drug screen was done today to rapidly obtain and integrate results into clinical assessment and decision-making for ongoing safe prescribing of controlled substances. The results of the presumptive UDS done today was positive for oxy. She is prescribed percocet and oxycontin. Because presumptive UDS positive results are not definitive due to sensitivity and specificity and cross reactivity limitations and negative results do not necessarily indicate absence of drugs or substances in the urine specimen, confirmation will identify specific prescribed and non-prescribed medications or illicit use for ongoing safe prescribing of controlled substances including benzodiazepines, opioid agonist, opioids antagonist, partial agonist, stimulants, muscle relaxers, antidepressants, sleep aids, anti-seizure medicine, and alcohol. Urine drug analysis is used to assist with diagnosis and therapeutic decision-making concerning pretreatment assessment. Intensity and frequency of monitoring with urine drug testing will be based on the risk stratification method in determining risk level for opioid addiction. *UDS Prelim  Consistent.        We have previously discussed the new CDC guidelines for opioid prescribing practices. We have educated the patient about morphine milliequivalents. Patient has voiced understanding that medications may be decreased over the next several months if we do not find adequate pain control or increased function as result of medication. We have also discussed with the patient that more frequent follow-ups will be based on the amount of pain medications used daily.          Nursing:   Pain Medication/Dose/Last Taken/# Taken Oxycontin ER 20 #60  Percocet 10 #90  Pregabalin 50 #90  Is it helping? Yes  Physical Therapy  no Home Exercises  [facility]  no New medical problems or surgeries  no New medications  no New allergies  no New antibiotics, fever, infection  Other 6/12/2024 Cath Guided OTONIEL @ C5-6 Pre 7 Post 3      Current Medications:   OxyCONTIN Oral Tablet ER 12 Hour Abuse-Deterrent 20 MG (11/14/2024) Take 1 tablet every 12 hours for 28 day(s)  Lyrica Oral Capsule 50 MG (9/12/2024) Take 1 capsule three times a day for 30 day(s)  Baclofen Oral Tablet 10 MG (9/30/2024) Take 1 tablet every night at bedtime for 30 day(s)  Percocet Oral Tablet  MG (11/14/2024) Take 1 tablet three times a day as needed for 30 day(s)      Previous Studies:  MRI; Findings  STUDY:  MRI thoracic spine  HISTORY:  Back pain right lower extremity pain for 6 years. Original injury 2018 related to a fall.  TECHNIQUE: Sagittal T1 weighted, T2 weighted and STIR images of the thoracic spine and axial T1 weighted and T2 weighted images of the thoracic spine are presented.    FINDINGS:   Coronal  view demonstrates moderate levoscoliosis of the thoracic spine. The height of the thoracic vertebral bodies is maintained and there is otherwise normal kyphotic curvature and alignment. There is some disc space narrowing and loss of disc signal consistent with disc desiccation discogenic change from the T5-6 through T9-10 levels. The remaining discs are normally hydrated. No obvious abnormal signal within the cord is seen.  T6-7: There is a prominent right paracentral disc herniation extending approximately 4 mm beyond the vertebral body margin and impinging on the right ventral aspect of the cord. There is flattening of the right ventral aspect of the cord. No central canal stenosis is seen. The neural canals remain patent superiorly.  T7-8: There is a left paracentral left lateral disc herniation extending  approximately 3 mm beyond the vertebral body margin contributing to some narrowing of the left neural canal. No cord deformity is seen. No central canal stenosis is seen. The right neural canal is patent.  T9-10: There is a large left paracentral left lateral disc herniation which extends approximately 6-7 mm beyond the vertebral body margin and extends both above and below the disc level impinging on the left ventral aspect of the cord causing flattening of the left ventral aspect of the cord and contributing to mild central canal stenosis. Disc contributes to left neural canal compromise. The right neural canal remains patent superiorly.    IMPRESSION:     1. Moderate levoscoliosis of the thoracic spine.  2. Prominent right paracentral disc herniation T6-7 with cord impingement.  3. Left paracentral left lateral disc herniation T7-8 contributing to left neural canal compromise.  4. Large left paracentral left lateral disc herniation T9-10 with cord impingement, central canal stenosis and left neural canal compromise.      Electronically Signed by AWILDA CALDERON at 14-Dec-2022 06:33:52 AM     Contrast Type and Amount: NO CONTRAST     EXAM:MRI cervical spine without contrast  HISTORY:  Cervical radiculopathy. Left-sided neck pain.  COMPARISON:  No prior exam of the cervical spine. Correlation made with MRI thoracic spine December 2022  TECHNIQUE:   Sagittal T1, T2, stir and axial T2 sequences of the cervical spine were performed without intravenous contrast.  FINDINGS:   Cervical vertebral alignment is normal. Vertebral bodies normal in height. Marrow signal pattern exhibited in the vertebrae is normal.  Imaged portions of the posterior fossa and craniocervical junction are normal.  C1-2: Alignment is normal. There is no canal narrowing.  C2-3: No disc herniation or canal stenosis. Neural foramina are patent.  C3-4: Uncinate hypertrophy and facet arthropathy mildly narrows right foraminal caliber. No disc herniation or  central canal stenosis.  C4-5: Bilateral facet arthropathy.  The disc is desiccated and mildly bulging circumferentially approximately 2 mm. Disc bulge in combination with uncinate hypertrophy causes moderate right and mild left foraminal stenosis. Disc bulging mildly deforms the anterior thecal sac but there is no cord contact. Mild canal stenosis.  C5-6: Bilateral facet arthropathy. Disc is desiccated and mildly narrowed. There is disc bulge with superimposed broad-based posterior disc herniation measuring 3 mm AP dimension beyond vertebral body margin. There is mild abutment of the ventral cord with minimal cord flattening and mild-moderate canal stenosis. Extension of broad-based disc herniation and associated uncinate hypertrophy in combination with the facet arthropathy results in moderate to severe bilateral foraminal stenosis, slightly greater on the left.  C6-7: Bilateral facet arthropathy. Disc desiccation and disc bulge with superimposed broad-based disc herniation centrally and lateralizing to the left. This disc herniation measures up to 3.4 mm beyond expected vertebral body margin. There is effacement of CSF anterior to the spinal cord with mild canal stenosis. Asymmetric extension of disc herniation into the left neural foramen causes severe left foraminal stenosis.  C7-T1: No disc herniation or significant spinal canal or foraminal narrowing. Upper thoracic levels are unremarkable.  No cord signal alteration. No concerning abnormality exhibited in the immediate paraspinal soft tissues.  IMPRESSION:     Broad-based posterior disc herniation centrally and lateralizing to the left at C6-7 effaces CSF anterior to the spinal cord mild canal stenosis and extends into and causes severe narrowing of the left neural foramen in combination with uncinate hypertrophy and facet arthropathy.  Disc bulge with superimposed broad-based posterior disc herniation and vertebral osteophyte complex at C5-6 causes mild  cord abutment and mild to moderate canal stenosis. In combination with uncinate hypertrophy and facet arthropathy there is severe bilateral foraminal narrowing.  No cord signal alteration.    Electronically Signed by VALERIA ERIC at 24-Aug-2023 11:51:10 AM     Contrast Type and Amount: NO CONTRAST  <Addendum Signed by VALERIA ERIC at 22-Dec-2023 10:29:54 AM  No unanticipated enhancement detected on postcontrast images.  Addendum End>  EXAM: MRI lumbar spine with and without contrast  HISTORY:  Lumbar radiculopathy. Back injury from a fall with pain in the upper left side  COMPARISON:  No previous lumbar spine exam. Correlation is made with MRI thoracic spine December 2022   TECHNIQUE:   Sagittal T1, T2, stir and axial T1 and T2 weighted sequences of the lumbar spine were performed before intravenous contrast. Subsequently intravenous contrast was administered and axial and sagittal T1 weighted sequences were performed.  FINDINGS:    There are postoperative changes of anterior and posterior fusion at L5-S1 across grade 1 spondylolisthesis. Bilateral pedicle screws and rods and anterior and interbody fusion hardware are present related to the fusion. There is no significant surrounding marrow or soft tissue edema to strongly indicate hardware complication. Above this level alignment of lumbar vertebrae is normal. Vertebral bodies are normal in height. Lower cord is normal in signal. Conus terminates at T12-L1.  Mild desiccation of disc at L4-5. Minimal osteophytosis and very subtle discogenic Modic type 2 endplate signal changes are present in the L4 and L5 vertebral bodies.  L1-2: No disc herniation or canal or foraminal narrowing.  L2-3: No disc herniation or canal or foraminal stenosis.  L3-4: No disc herniation or canal or foraminal stenosis.  L4-5: Disc is desiccated and there is 2 mm of circumferential bulging. No significant nerve root displacement or spinal canal stenosis. Neural foramina are  patent.  L5-S1: Postoperative changes of fusion across grade 1 spondylolisthesis at this level. There is no residual disc. No spinal canal or foraminal stenosis.  Upper sacrum is unremarkable. No concerning abnormality exhibited in the immediate paraspinal soft tissues.  IMPRESSION:     Postoperative changes of fusion at L5-S1. No evidence of hardware complication or canal stenosis at the operative level. There is underlying grade 1 spondylolisthesis at this level.  Circumferential disc bulging at L4-5 without canal stenosis.  No acute fracture.     Electronically Signed by VALERIA ERIC at 22-Dec-2023 10:15:52 AM  Amended by VALERIA ERIC at 22-Dec-2023 10:29:54 AM  Contrast Type and Amount: 13ML OF 15ML CLARISCAN GIVEN  EXAM: MRI thoracic spine without contrast  HISTORY:  Thoracic radiculopathy. Upper to mid back pain with recent worsening extending to the left side with some weakness.  COMPARISON:  MRI thoracic spine December 2022   TECHNIQUE:   Sagittal T1, T2, stir and axial T1 and T2 weighted sequences of the thoracic spine were performed without intravenous contrast.  FINDINGS:    Mildly accentuated thoracic kyphosis.  Moderate levoscoliosis of the thoracic spine also again exhibited.  The vertebral bodies normal in height.  There is multilevel disc desiccation and disc narrowing similar in pattern to the degenerative changes present on prior exam predominantly involving T5 through T10 levels.  Throughout these levels there are marginal osteophytes from anterior vertebral bodies .  There has been development of adjacent endplate defects in inferior T9 and superior T10 vertebral bodies with surrounding edema suggesting Schmorl's nodes and discogenic type 1 endplate signal change.  Mild chronic Modic type 2 predominant endplate signal changes are also seen in adjacent vertebral bodies at T6-7 and T7-8.  T1-2:  1 mm disc bulge.  No cord contact or canal narrowing.  T2-3:  1 mm disc bulge minimally deforms  the anterior thecal sac lateralizing to the left. No cord contact or canal stenosis. Caliber of left neural foramen appears mildly narrowed from disc bulging and facet arthropathy.  Similar findings were present previously.  T3-4:  No disc herniation or canal narrowing.  T4-5:  No disc herniation or canal narrowing.  T5-6:  Disc is narrowed anteriorly. However there is no significant posterior disc herniation or canal narrowing.  T6-7:  Chronic degeneration of the disc which is moderately desiccated and narrowed.  Posteriorly there is a large right paracentral disc herniation and vertebral osteophyte complex that measures 4 mm AP dimension deforming right aspect of the spinal cord with moderate indentation of the right ventral aspect of the cord that is very similar to that seen previously.  Adequate CSF remains present posterior to the cord without significant central canal stenosis.  Neural foramina are patent.  T7-8:  3 mm AP dimension left paracentral disc herniation deforms left anterior aspect of the thecal sac. This is unchanged.  No significant spinal canal narrowing.    T8-9:  1 mm of disc bulging.  No cord contact or canal narrowing.  T9-10:  Disc is desiccated and significantly narrowed.  There is a large left paracentral predominant disc herniation and vertebral osteophyte complex measuring 6 mm beyond vertebral body margin in AP dimension and approximately 10-11 mm craniocaudal extent moderately deforming the left aspect of the spinal cord displacing the cord to the right.  There is canal stenosis and an element of narrowing of the proximal left neural foramen.  No significant change compared to prior exam.  T10-11:  No disc herniation or canal narrowing.  T11-12:  No disc herniation or canal narrowing.  T12-L1:  No disc herniation or canal narrowing.  No areas of cord signal alteration despite the significant cord deformity again exhibited at T6-7 and T9-10 levels.  No concerning abnormality exhibited  in the immediate paraspinal soft tissues.  IMPRESSION:     Levoscoliosis of the thoracic spine with multilevel disc degeneration and redemonstration of large chronic disc herniations causing significant cord deformity at T9-10 and T6-7 that are very similar to that seen previously.  Left paracentral disc herniation at T7-8 also appears similar in size compared to prior exam measuring approximately 3 mm AP dimension deforming left anterior aspect of the thecal sac.  There has been development of endplate defects typical for Schmorl's nodes in T9 and T10 vertebral bodies with rim of surrounding marrow edema about the Schmorl's nodes.  The appearance of the thoracic spine has otherwise not significantly changed.  No cord signal alteration.      Electronically Signed by VALERIA ERIC at 15-Feb-2024 03:02:05 PM     Contrast Type and Amount: NO CONTRAST GIVEN   Surgical History:   Hysterectomy; Spinal Surgery   Uterine Suspension  Cholecystectomy  ALIF L5-S1      Social History:      Smoking Status: Never smoker; Last Reviewed: 11/14/2024                        Denies alcohol use  No drug use  Occupation: works full-time  Marital status: Single               Family History:   There is a family history of  Hypertension, Cancer  Heart Disease  .      Review of Systems:   General:  Patient denies  sweats, fatigue, fever, chills, recent change in weight.  Eyes:  Patient denies  blurred vision, glaucoma.  Ears, Nose and Throat:  Patient denies  hearing loss, ringing in the ears, sinus congestion, difficulty swallowing.  Cardiovascular:  Patient denies  arrhythmia, chest pain, palpitations.  Respiratory:  Patient denies  requiring oxygen, shortness of breath, cough, wheezing.  Gastrointestinal:  Patient denies  ulcer, heartburn, nausea, vomiting, blood in stool, diarrhea, constipation, hemorrhoids.  Genitourinary:  Patient denies  hematuria, kidney stones, urinary frequency, polyuria, urinary hesitancy, dysuria, burning on  urination, prostate problems, menstrual complications, painful intercourse.  Endocrine:  Patient denies  polydipsia, temperature intolerance, thyroid problems, loss of hair from head or body.  Hematologic:  Patient denies  bleeding tendencies, easy bruising tendency, bleeding disorders, bleeding gums.  Musculoskeletal:   Patient admits to   joint pain, joint stiffness, muscle cramps.  Patient denies  fractures, walking aids.  Neurologic  Patient denies  dizziness, seizures, headache, not confused or disoriented, memory lapses or loss, paralysis, difficulty walking.  Psychologic:  Patient denies  anxiety, panic attacks, mood disorder, emotional problems, depression, sleep disturbances, suicidal thoughts, suicide attempt(s).  Skin:  Patient denies  pruritus, jaundice, skin rash.       DEPRESSION SCREENING:   Not at all the patient reports little interest or pleasure in doing things.  Not at all the patient reports feeling down, depressed, or hopeless.  Date Depression Screening Last Done: 11/14/2024   PHQ-2 Score: 0; PHQ-9 Score: incomplete      Vital Signs:   Weight 132 lbs; Height 5 ft 2 in; BMI 24.1   11/14/2024 11:28 AM (CST)  Respiration Rate 18; Pulse Rate 85 bpm; Blood Pressure 124 / 87 mm/Hg; Pain Level: 5         Physical Examination:   Cranial Nerves II-XII grossly intact.  No apparent distress.  No somnolence or slurred speech.     Lumbar spine  patient has pain with flexion and extension lateral rotation  Lumbar facets are tender to palpation  No focal neurologic deficits noted      Back Motion:   Lumbar / lumbosacral spine abnormal.         Additional Physical Findings:  General abnormal,   Awake, alert, oriented to time, place and person, well developed, pleasant, uncomfortable, seated  Musculoskeletal abnormal,   Thoracic spine abnormal, thoracolumbar spine abnormal  Posture normal  Neurologic normal neurologic exam,   Cranial nerves, motor function  Gait and stance normal  Skin normal skin exam,   Dry,  warm       Toxicology Report   Toxicology was performed.   Reason for Toxicology:  A presumptive urine drug screen was done today to rapidly obtain and integrate results into clinical assessment and decision-making for ongoing safe prescribing of controlled substances.   Test Date/Time: 11/14/2024 00:00   Tested By: YAYO   Oxycodone  (OXY): Result = Positive; Control = Positive   Morphine  (OPI): Result = Positive; Control = Positive   Amphetamines  (AMP): Result = Negative; Control = Positive   Oxazepam  (BZO): Result = Negative; Control = Positive   Methadone  (MTD): Result = Negative; Control = Positive   Secobarbital  (BAR): Result = Negative; Control = Positive   Tricyclic Antidepressants  (TCA): Result = Negative; Control = Positive   Nortriptyline  (TCA): Result = Negative; Control = Positive   Marijuana-Carboxy Tetrahydrocannabinoid   (THC): Result = Negative; Control = Positive   Cocaine  (LEXIE): Result = Negative; Control = Positive   Ecstasy-Methylenedioxymethamphetamine  (MDMA): Result = Negative; Control = Positive   D Methamphetamine  (MET): Result = Negative; Control = Positive   Phencyclidine  (PCP): Result = Negative; Control = Positive   Adulterants  (OX, SG, pH): Result = Negative; Control = Positive      Assessment:   (M54.50) - Low back pain  (M47.817) - Lumbosacral spondylosis without myelopathy  (M54.6) - Thoracic back pain  (M54.2) - Neck pain  (M40.209) - Kyphosis  (Z98.890) - Hx of lumbosacral spine surgery  (M54.12) - Cervical radiculopathy  (G89.29) - Other chronic pain  (Z79.891) - Long term (current) use of opiate analgesic      Plan:   Physical Activity Counseling   Nutrition Counseling      Analgesia: Patient reports adequate levels on analgesia.  Activity: Patient encouraged to exercise and continue normal activities.  Adverse Reactions:  No adverse reactions  Aberrant Behavior: No aberrant behavior noted.  appropriate and UDS consistent  Affect: Normal mood.  Adjuncts:         Functional Goals: Patient will have decreased pain with current medications and have increased function/activities.  Progress toward functioning goals: Pt will maintain/and or have increase in function and activity with current meds.  Reason for initiating/continuing opioids: Improve function, reduce pain, reduce use of er/urgent care and minimize organ toxicity from analgesics.  Continue medication doses as currently prescribed:  Other treatment modalities/referrals recommended:  Risks and benefits of test or referrals discussed:  Urine Drug screen ordered:  Contract/agreement signed or updated using lay language.  Follow up interval:  1-3 months  Follow up with Dr. Briceño.        1. The patient has chronic nonmalignant pain with pathology likely contributing to the symptoms and based on the improvement of pain and function in response to opioid medications; lack of serious side effects and limited identifiable aberrant behavior; I believe it is reasonable to prescribe opioid therapy which requires a greater than 72 hours supply of opioid therapy. Patient was educated on the potential dependency issues associated with long-term opioid use; as well as the decreasing efficacy with prolonged use. Patient has been advised of the risk/benefits and side effect and how to utilize each medication. Patient was informed that and deviation from therapy protocol could lead to discontinuation of opioids. Patient was given the opportunity to begin weaning off opioids. Patient was given and opportunity to ask and have questions answered regarding the continuation of opioid therapy. At the time patient is at goal in terms of the pain treatment plan. Patients medications have been reviewed with patient. We will follow up with patient to review effectiveness of medication, and to discuss any potential side effects. The morphine milliequivalents (MME) have been calculated for this patient. According to the CDC guidelines, patients  with an MME less than 50 should be monitored for pain and function frequently. Therefore this patient will be monitored every 1-3 months via office visits,  evaluations, and urinary drug screens.     2. Follow up in 2 weeks after procedure.  Patient may contact office for earlier follow-up should an issue arise.     3.  Due to the presence of cervical radicular symptoms, we have elected to proceed with cervical epidural steroid injections at the level confirmed by physical examination and accompanying studies. This is medically necessary to improve function, reduce pain and limitations, and to reduce the reliance on pain medications. Additional epidural steroid injections will be based on patient improvement. Cath guided OTONIEL @ C5-6 level #2 (M54.12) - Cervical radiculopathy     Cervical MRI dated 08/24/2023C5-6: Bilateral facet arthropathy. Disc is desiccated and mildly narrowed. There is disc bulge with superimposed broad-based posterior disc herniation measuring 3 mm AP dimension beyond vertebral body margin. There is mild abutment of the ventral cord with minimal cord flattening and mild-moderate canal stenosis. Extension of broad-based disc herniation and associated uncinate hypertrophy in combination with the facet arthropathy results in moderate to severe bilateral foraminal stenosis, slightly greater on the left.     4. Procedure instructions given to pt who verbalized understanding. Procedure sheet given to the patient after verbally voicing understanding of the sheet concerning blood thinners, NPO status, and importance of a .     5. Monitored Anesthesia Care medical necessity authorization request:       Monitor anesthesia request is medically indicated for the scheduled ___CESI_____procedure due to:     - needle phobia and anxiety, placing the patient at risk during the provided service.___x__  - patient has a BMI greater than 45 ____  - patient has severe sleep apnea for which BiPAP and oxygen  are needed while sleeping._____  - patient is unable to follow simple commands due to mental state.____  - patient has an ASA class greater than 3 and requires constant presence of an anesthesiologist/CRNA during the procedure.____  - patient has severe problems with muscles and muscle spasticity that makes it hard to lie still. ____  - patient suffers from chronic pain and is unable to function due to diminished ADL's.__x__  - patient is dependent on opioids or sedatives._x___  - Other ___x_     NARCOTIC STATEMENT  Patient is taking the narcotic pain medications as prescribed. Refill is being given because of the benefit to the patient in regards to the pain. Patient has agreed not to abuse medication and not to take it more than what is prescribed. The nature of the drug including the potential for addiction and dependency and abuse was also discussed with the patient. Patient has developed physical dependency for the narcotic pain medication for his pain relief.  Patient has also developed tolerance to the sedative effect of the narcotic pain medications.  Patient has decided to continue with these medications despite potential for addiction as described by this office.  This was stressed to the patient that it is the patient's responsibility to secure the narcotic medication and in any event of loss for any reason whatsoever,  there will be no refill before the next due date. Patient also understands that they are not supposed to drive or work on machinery while taking these medications.  Also explained to the patient that in the event of traffic citation, the presence of this drug in  bloodstream may result in DUI.  Patient has been advised not to drink alcohol while taking this medication.  Patient has verbalized understanding of our office policy and has signed a contract with us in this regard.        Compliance Statement:  Documented by Joan Ryan RN acting as a scribe for Claude Briceño M.D. The  note accurately reflects work and decisions performed by me.      Prescriptions Written Today:  OxyCONTIN Oral Tablet ER 12 Hour Abuse-Deterrent 20 MG  Take 1 tablet every 12 hours for 28 day(s)  Refills: No Refills  Rx quantity: 56  Take 1 tablet every 12 hours for 28 day(s)  Refills: No Refills  Rx quantity: 56,  Percocet Oral Tablet  MG  Take 1 tablet three times a day as needed for 30 day(s)  Refills: No Refills  Rx quantity: 90  Take 1 tablet three times a day as needed for 30 day(s)  Refills: No Refills  Rx quantity: 90                 Claude Briceño MD      Electronically signed: 11/17/2024 7:37:38 AM           Chief Complaint:      HPI:       Assessment/Plan:         Claude Briceño MD  Pain Management  [facility] - Pain Management

## 2024-12-18 NOTE — ANESTHESIA PREPROCEDURE EVALUATION
12/18/2024  Ebony Lucero is a 49 y.o., female.      Pre-op Assessment    I have reviewed the Patient Summary Reports.     I have reviewed the Nursing Notes. I have reviewed the NPO Status.   I have reviewed the Medications.     Review of Systems  Anesthesia Hx:  No problems with previous Anesthesia                Pulmonary:        Sleep Apnea                Hepatic/GI:     GERD                    Physical Exam  General: Well nourished, Cooperative, Alert and Oriented    Airway:  Mallampati: II   Mouth Opening: Normal  TM Distance: Normal  Tongue: Normal  Neck ROM: Normal ROM    Dental:  Intact        Anesthesia Plan  Type of Anesthesia, risks & benefits discussed:    Anesthesia Type: Gen Natural Airway, MAC  Intra-op Monitoring Plan: Standard ASA Monitors  Post Op Pain Control Plan: multimodal analgesia  Induction:  IV  Informed Consent: Informed consent signed with the Patient and all parties understand the risks and agree with anesthesia plan.  All questions answered. Patient consented to blood products? Yes  ASA Score: 3  Day of Surgery Review of History & Physical: I have interviewed and examined the patient. I have reviewed the patient's H&P dated: There are no significant changes.     Ready For Surgery From Anesthesia Perspective.     .

## 2025-01-29 ENCOUNTER — ANESTHESIA (OUTPATIENT)
Dept: PAIN MEDICINE | Facility: HOSPITAL | Age: 50
End: 2025-01-29
Payer: COMMERCIAL

## 2025-01-29 ENCOUNTER — ANESTHESIA EVENT (OUTPATIENT)
Dept: PAIN MEDICINE | Facility: HOSPITAL | Age: 50
End: 2025-01-29
Payer: COMMERCIAL

## 2025-01-29 ENCOUNTER — HOSPITAL ENCOUNTER (OUTPATIENT)
Facility: HOSPITAL | Age: 50
Discharge: HOME | End: 2025-01-29
Attending: ANESTHESIOLOGY | Admitting: ANESTHESIOLOGY
Payer: COMMERCIAL

## 2025-01-29 VITALS
TEMPERATURE: 97 F | DIASTOLIC BLOOD PRESSURE: 61 MMHG | SYSTOLIC BLOOD PRESSURE: 96 MMHG | RESPIRATION RATE: 13 BRPM | OXYGEN SATURATION: 98 % | BODY MASS INDEX: 23.96 KG/M2 | HEART RATE: 69 BPM | HEIGHT: 62 IN | WEIGHT: 130.19 LBS

## 2025-01-29 DIAGNOSIS — M54.12 CERVICAL RADICULOPATHY: ICD-10-CM

## 2025-01-29 PROCEDURE — 62321 NJX INTERLAMINAR CRV/THRC: CPT | Performed by: ANESTHESIOLOGY

## 2025-01-29 PROCEDURE — 27000284 HC CANNULA NASAL: Performed by: NURSE ANESTHETIST, CERTIFIED REGISTERED

## 2025-01-29 PROCEDURE — D9220A PRA ANESTHESIA: Mod: ,,, | Performed by: NURSE ANESTHETIST, CERTIFIED REGISTERED

## 2025-01-29 PROCEDURE — 63600175 PHARM REV CODE 636 W HCPCS: Performed by: NURSE ANESTHETIST, CERTIFIED REGISTERED

## 2025-01-29 PROCEDURE — 63600175 PHARM REV CODE 636 W HCPCS: Performed by: ANESTHESIOLOGY

## 2025-01-29 PROCEDURE — 25500020 PHARM REV CODE 255: Performed by: ANESTHESIOLOGY

## 2025-01-29 PROCEDURE — 37000008 HC ANESTHESIA 1ST 15 MINUTES: Performed by: ANESTHESIOLOGY

## 2025-01-29 PROCEDURE — 27000716 HC OXISENSOR PROBE, ANY SIZE: Performed by: NURSE ANESTHETIST, CERTIFIED REGISTERED

## 2025-01-29 RX ORDER — IOPAMIDOL 612 MG/ML
INJECTION, SOLUTION INTRATHECAL CODE/TRAUMA/SEDATION MEDICATION
Status: DISCONTINUED | OUTPATIENT
Start: 2025-01-29 | End: 2025-01-29 | Stop reason: HOSPADM

## 2025-01-29 RX ORDER — LIDOCAINE HYDROCHLORIDE 20 MG/ML
INJECTION INTRAVENOUS
Status: DISCONTINUED | OUTPATIENT
Start: 2025-01-29 | End: 2025-01-29

## 2025-01-29 RX ORDER — BUPIVACAINE HYDROCHLORIDE 2.5 MG/ML
INJECTION, SOLUTION INFILTRATION; PERINEURAL CODE/TRAUMA/SEDATION MEDICATION
Status: DISCONTINUED | OUTPATIENT
Start: 2025-01-29 | End: 2025-01-29 | Stop reason: HOSPADM

## 2025-01-29 RX ORDER — SODIUM CHLORIDE 9 MG/ML
500 INJECTION, SOLUTION INTRAVENOUS CONTINUOUS
Status: DISCONTINUED | OUTPATIENT
Start: 2025-01-29 | End: 2025-01-29 | Stop reason: HOSPADM

## 2025-01-29 RX ORDER — FENTANYL CITRATE 50 UG/ML
INJECTION, SOLUTION INTRAMUSCULAR; INTRAVENOUS
Status: DISCONTINUED | OUTPATIENT
Start: 2025-01-29 | End: 2025-01-29

## 2025-01-29 RX ORDER — TRIAMCINOLONE ACETONIDE 40 MG/ML
INJECTION, SUSPENSION INTRA-ARTICULAR; INTRAMUSCULAR CODE/TRAUMA/SEDATION MEDICATION
Status: DISCONTINUED | OUTPATIENT
Start: 2025-01-29 | End: 2025-01-29 | Stop reason: HOSPADM

## 2025-01-29 RX ORDER — PROPOFOL 10 MG/ML
VIAL (ML) INTRAVENOUS
Status: DISCONTINUED | OUTPATIENT
Start: 2025-01-29 | End: 2025-01-29

## 2025-01-29 RX ADMIN — PROPOFOL 50 MG: 10 INJECTION, EMULSION INTRAVENOUS at 11:01

## 2025-01-29 RX ADMIN — FENTANYL CITRATE 100 MCG: 50 INJECTION, SOLUTION INTRAMUSCULAR; INTRAVENOUS at 11:01

## 2025-01-29 RX ADMIN — LIDOCAINE HYDROCHLORIDE 50 MG: 20 INJECTION, SOLUTION INTRAVENOUS at 11:01

## 2025-01-29 NOTE — TRANSFER OF CARE
"Anesthesia Transfer of Care Note    Patient: Ebony Lucero    Procedure(s) Performed: Procedure(s) (LRB):  INJECTION, STEROID, SPINE, CERVICAL, EPIDURAL (N/A)    Patient location: Other: pain    Anesthesia Type: general    Transport from OR: Transported from OR on room air with adequate spontaneous ventilation    Post pain: adequate analgesia    Post assessment: no apparent anesthetic complications and tolerated procedure well    Post vital signs: stable    Level of consciousness: responds to stimulation and sedated    Nausea/Vomiting: no nausea/vomiting    Complications: none    Transfer of care protocol was followed    Last vitals: Visit Vitals  BP (!) 82/47 (BP Location: Left arm, Patient Position: Lying)   Pulse 69   Temp 36.1 °C (97 °F)   Resp 13   Ht 5' 2" (1.575 m)   Wt 59.1 kg (130 lb 3.2 oz)   SpO2 96%   BMI 23.81 kg/m²     "

## 2025-01-29 NOTE — ANESTHESIA POSTPROCEDURE EVALUATION
Anesthesia Post Evaluation    Patient: Ebony Lucero    Procedure(s) Performed: Procedure(s) (LRB):  INJECTION, STEROID, SPINE, CERVICAL, EPIDURAL (N/A)    Final Anesthesia Type: general      Patient location during evaluation: GI PACU  Patient participation: Yes- Able to Participate  Level of consciousness: awake and alert  Post-procedure vital signs: reviewed and stable  Pain management: adequate  Airway patency: patent    PONV status at discharge: No PONV  Anesthetic complications: no      Cardiovascular status: blood pressure returned to baseline and hemodynamically stable  Respiratory status: spontaneous ventilation  Hydration status: euvolemic  Follow-up not needed.              Vitals Value Taken Time   BP 96/61 01/29/25 1150   Temp 36.1 °C (97 °F) 01/29/25 1122   Pulse 65 01/29/25 1150   Resp 13 01/29/25 1150   SpO2 99 % 01/29/25 1150   Vitals shown include unfiled device data.      Event Time   Out of Recovery 11:50:00         Pain/Marta Score: Marta Score: 10 (1/29/2025 11:50 AM)           Nutrition Care Plan    Nutrition Diagnosis:   Inadequate intake related to suspected decreased appetite in setting of anxiety with increased nutrition needs secondary to cirrhotic disease process as evidenced by estimated nutrition needs, variable meal intakes (0-100%).      Intervention:  Modified diet:   Will continue 3 gm sodium, Low Potassium (60 mEq) diet.     Commercial beverage:   Pt has tried multiple supplements, disliked them all.    Nutrition relationship to health/disease:   Noted patient met with outpatient RD 4/10/17, received diet education including weight loss counseling. Pt declined formal diet education at last RD encounter this admission (6/16) although did briefly discuss priority diet modifications in the setting of recent initiation of HD with pt/family at that time. Pt has not been available since to discuss nutrition despite multiple attempts (off unit at tests or at HD). There will be a dietitian at outpatient dialysis center to provide further guidance.    Medications:   Noted patient on remeron, which can have appetite-stimulating properties.    Monitoring and Evaluation:  Amount of food:   goal: increase meal intakes to consistently consume >60% meals/supplement (progressing - po intakes have improved to % of 2-3 meals per day, pt dislikes supplements).

## 2025-01-29 NOTE — PLAN OF CARE
Plan:  D/c pt via wheelchair at 1155  Informed pt if does not void in 8 hours to go to ER. Notify if redness, drainage, from injection site or fever over next 3-4 days. Rest and drink plenty of fluids for the remainder of the day. No lifting over 5 lbs. For the remainder of the day. Continue regular medications as prescribed. May take pain medications as prescribed.     Pain improved 100%  Pre-procedure pain: 7  Post-procedure pain: 0

## 2025-01-29 NOTE — ANESTHESIA PREPROCEDURE EVALUATION
01/29/2025  Ebony Lucero is a 49 y.o., female.      Pre-op Assessment    I have reviewed the Patient Summary Reports.     I have reviewed the Nursing Notes. I have reviewed the NPO Status.   I have reviewed the Medications.     Review of Systems  Anesthesia Hx:  No problems with previous Anesthesia                Pulmonary:        Sleep Apnea                Hepatic/GI:     GERD                  Past Medical History:   Diagnosis Date    GERD (gastroesophageal reflux disease)     Sleep apnea     CPAP       Past Surgical History:   Procedure Laterality Date    BACK SURGERY  2018    CHOLECYSTECTOMY      EPIDURAL STEROID INJECTION INTO CERVICAL SPINE N/A 05/15/2024    Procedure: INJECTION, STEROID, SPINE, CERVICAL, EPIDURAL;  Surgeon: Claude Briceño MD;  [facility];  Service: Pain Management;  Laterality: N/A;  C5-6 cath guided OTONIEL    EPIDURAL STEROID INJECTION INTO CERVICAL SPINE N/A 6/12/2024    Procedure: INJECTION, STEROID, SPINE, CERVICAL, EPIDURAL;  Surgeon: Claude Briceño MD;  [facility];  Service: Pain Management;  Laterality: N/A;  C5-6 cath guided OTONIEL    EPIDURAL STEROID INJECTION INTO CERVICAL SPINE N/A 12/18/2024    Procedure: INJECTION, STEROID, SPINE, CERVICAL, EPIDURAL;  Surgeon: Claude Briceño MD;  [facility];  Service: Pain Management;  Laterality: N/A;  C5-6 cath guided OTONIEL    HYSTERECTOMY         No family history on file.    Social History     Socioeconomic History    Marital status:    Tobacco Use    Smoking status: Never    Smokeless tobacco: Never   Substance and Sexual Activity    Alcohol use: Not Currently       No current facility-administered medications for this encounter.     Current Outpatient Medications   Medication Sig Dispense Refill    baclofen (LIORESAL) 10 MG tablet Take 10 mg by mouth 3 (three)  times daily.      oxyCODONE (OXYCONTIN) 20 mg 12 hr tablet Take 20 mg by mouth every 12 (twelve) hours.      oxyCODONE-acetaminophen (PERCOCET)  mg per tablet Take 1 tablet by mouth every 8 (eight) hours as needed for Pain.      pantoprazole (PROTONIX) 40 MG tablet Take 40 mg by mouth once daily.      pregabalin (LYRICA) 50 MG capsule Take 50 mg by mouth 3 (three) times daily.         Review of patient's allergies indicates:  No Known Allergies     Physical Exam  General: Well nourished, Cooperative, Alert and Oriented    Airway:  Mallampati: II   Mouth Opening: Normal  TM Distance: Normal  Tongue: Normal  Neck ROM: Normal ROM    Dental:  Intact        Chemistry        Component Value Date/Time     (L) 11/07/2022 1200    K 4.0 11/07/2022 1200     11/07/2022 1200    CO2 24 11/07/2022 1200    BUN 11 11/07/2022 1200    CREATININE 0.73 11/07/2022 1200    GLU 92 11/07/2022 1200    GLU 92 11/07/2022 1200        Component Value Date/Time    CALCIUM 8.7 11/07/2022 1200    ALKPHOS 51 11/07/2022 1200    AST 19 11/07/2022 1200    ALT 30 11/07/2022 1200    BILITOT 0.3 11/07/2022 1200    ESTGFRAFRICA 133 08/27/2020 1300    EGFRNONAA 110 08/27/2020 1300        Lab Results   Component Value Date    WBC 7.83 11/07/2022    HGB 14.2 11/07/2022    HCT 42.3 11/07/2022     11/07/2022     No results found for this or any previous visit.     Anesthesia Plan  Type of Anesthesia, risks & benefits discussed:    Anesthesia Type: Gen Natural Airway  Intra-op Monitoring Plan: Standard ASA Monitors  Post Op Pain Control Plan: multimodal analgesia  Induction:  IV  Informed Consent: Informed consent signed with the Patient and all parties understand the risks and agree with anesthesia plan.  All questions answered. Patient consented to blood products? Yes  ASA Score: 3  Day of Surgery Review of History & Physical: H&P Update referred to the surgeon/provider.    Ready For Surgery From Anesthesia Perspective.     .

## 2025-01-29 NOTE — DISCHARGE SUMMARY
Patient underwent  Catheter Guided Cervical Epidural Steroid Injection under Fluoroscopic Guidance at C5-6 level procedure 01/29/2025. The pt will follow up in clinic. Discharged home. Discharge Dx:  Cervical Radiculopathy

## 2025-01-29 NOTE — OP NOTE
01/29/2025  Ebony Lucero 1975    PREOPERATIVE DIAGNOSIS:     Cervical Radiculopathy                                                              Neck Pain     POSTOPERATIVE DIAGNOSIS:  Cervical Radiculopathy                                                              Neck Pain     PROCEDURE:  Catheter Guided Cervical Epidural Steroid Injection under Fluoroscopic Guidance at C5-6 level.      SURGEON: Dr Claude Briceño  COMPLICATIONS: None  ANESTHESIA:  MAC  DRAINS AND PACKS:  None  BLOOD LOSS:  None     The patient was identified in the holding area.  The risks and benefits of the procedure were again explained to the patient and the patient agreed to proceed.  The patient was taken in stable condition to the procedure room and was placed in prone position on the C-Arm table.  All pressure points were checked and padded comfortably while the patient was awake.  Time out was completed.  Standard ASA monitors applied.  Anesthesia was initiated.  Patient was comfortable and the neck was then prepped and draped in usual sterile fashion. The skin wheal  using Bupivacaine 0.25% (2.5 mg/ml) 1cc was raised over the C7-T1 interspaces and an 18 gauge needle was advanced under fluoroscopic guidance into the epidural space with loss of resistance confirmed with air.  The stylet was removed and there was negative aspiration of heme and CSF.  A Versicath Catheter was advanced to the target level at  C 5-6  level. The patient then received a 2 cc allotment of Omnipaque(240mg/ml) contrast with excellent delineation within the epidural space.  After confirmation and appropriate placement of the cannula, the patient then received  Kenalog 40mg/ml 1ml with 2ccs of 0.25% Bupivacaine(2.5mg/ml) and 2ccs of preservative free Saline.  The needle was removed with tip intact.  There was adequate hemostasis at the conclusion of the procedure. The patient was taken in stable condition to the holding area and monitored for the  appropriate time of convalescence.  The patient tolerated the procedure well with no adverse events.      The patients preoperative pain score was  7/10.   The postoperative pain score is  /10.

## 2025-01-29 NOTE — H&P
[facility] - Pain Management  Pain Management  H&P    Patient Name: Ebony Lucero  MRN: [id number]  Admission Date: 2025  Primary Care Provider: Deejay Mejía MD    Patient information was obtained from     Subjective:     Principal Problem:     MITESH HOLGUIN MD,P.A.  [address] [place] [id number]                      RE: Ebony Lucero      : 1975   Date of Service: 2025   Procedure Follow-Up 24 @ [facility]--#2 Cath Guided OTONIEL @ C5-6 Pre 6 Post 4  Medication Refill   Existing Patient           Chief Complaint:   Neck pain described as constant; on today's visit the patient's pain is unchanged in nature from last visit,  Back pain burning in nature, constant, tingling; located in the thoracic region; aggravated by sitting, standing; relieved by heat, rest. Patient stated there is no improvement of back pain since last visit..   Patient seated in exam room 4 with c/o back pain  Controlled Substance Prescription Agreement signed and reviewed. Verbalizes understanding. 3-22-24  [place] Prescription Monitoring Program data was reviewed for this patient for the past 12 calendar months to ascertain any current,or past use of scheduled medications.                                                                                    Opioid Risk Tool                                                                                 Wayne each box                           Item Score                        Item Score                                                                              that applies.                               if Female.                          if Male                    1. Family history of substance abuse                  Alcohol  (  )                                      1                                     3                                                                              Illegal Drugs (  )                               2                                      3                                                                              Prescription Drugs (  )                     4                                     4        2. Personal History of substance abuse          Alcohol  (  )                                      3                                      3                                                                             Illegal Drugs (  )                               4                                     4                                                                             Prescription Drugs (  )                     5                                      5     3. Age (Wayne box if 16-45)                                           (  )                                          1                                      1        4. History of Preadolescent Sexual Abuse                   (  )                                         3                                      0        5. Psychological Disease    Attention Deficit disorder  (  )                                         2                                       2                                                             or                                              Obsessive Compulsive                                                           disorder                                                               or                                                                                       Bipolar Schizophrenia                                                              Depression                        (  )                                         1                                        1        Total=1     Total Score Risk Category           Low risk 0-3         Moderate risk 4-7              High risk >8           History of Present Illness:   What part of the body? back   Pain level at present 7      03/22/2024-History of present illness  Ebony Lucero  is a  48y/o  female who presents for evaluation and management of chronic pain in her lower back and bilateral hips.   Subjective: Patient presents today for evaluation for pain problems. Pt reports that pain started has been chronic for about 6 years but has been ongoing for years after accident. She ahs treated with TPC but was ready for a change. After review of records, pts physical exam and discussion with the patient we will plan to continue her meds. We will send her to PT for back strengthening exercises. She will consider TFLESI L5-S1, Consider TESI and consider SCS.      04/23/2024-pt here for follow up after her new pt appt and reports that she would like to schedule procedure for her neck and arm pain. We have reviewed her Cervical spine MRI again with the pt and after discussion with the pt based on her physical exam, symptoms and her MRI we will plan to schedule her for a Cath guided OTONIEL @ C5-6 level for dx of cervical radiculopathy.    Due to the presence of cervical radicular symptoms, we have elected to proceed with cervical epidural steroid injections at the level confirmed by physical examination and accompanying studies. This is medically necessary to improve function, reduce pain and limitations, and to reduce the reliance on pain medications. Additional epidural steroid injections will be based on patient improvement. Cath guided OTONIEL @ C5-6 level (M54.12) - Cervical radiculopathy     Cervical MRI dated 08/24/2023C5-6: Bilateral facet arthropathy. Disc is desiccated and mildly narrowed. There is disc bulge with superimposed broad-based posterior disc herniation measuring 3 mm AP dimension beyond vertebral body margin. There is mild abutment of the ventral cord with minimal cord flattening and mild-moderate canal stenosis. Extension of broad-based disc herniation and associated uncinate hypertrophy in combination with the facet arthropathy results in moderate to severe bilateral foraminal stenosis, slightly  greater on the left.        5/15/2024 Cath Guided OTONIEL @ C5-6 Pre 5 Post 0     05/23/2024-pt here for a follow up after procedure and med. She reports that the injection did help with her pain >95% and activity by > 95%. She is ready to schedule another one. We will plan to schedule her for #2 Cath guided OTONIEL @  C5-6 level. We will continue her meds the same.    Due to the presence of cervical radicular symptoms, we have elected to proceed with cervical epidural steroid injections at the level confirmed by physical examination and accompanying studies. This is medically necessary to improve function, reduce pain and limitations, and to reduce the reliance on pain medications. Additional epidural steroid injections will be based on patient improvement. Cath guided OTONIEL @ C5-6 level #2 (M54.12) - Cervical radiculopathy     Cervical MRI dated 08/24/2023C5-6: Bilateral facet arthropathy. Disc is desiccated and mildly narrowed. There is disc bulge with superimposed broad-based posterior disc herniation measuring 3 mm AP dimension beyond vertebral body margin. There is mild abutment of the ventral cord with minimal cord flattening and mild-moderate canal stenosis. Extension of broad-based disc herniation and associated uncinate hypertrophy in combination with the facet arthropathy results in moderate to severe bilateral foraminal stenosis, slightly greater on the left.  The previous urine drug screen was evaluated  03/22/2024 New pt UDS and it was consistent.     6/12/2024 Cath Guided OTONIEL @ C5-6 Pre 7 Post 3     07/15/2024-pt here for a follow up after procedure and meds. She reports that she would like to hold on the next one. We will continue meds and see her back in 2 mo.   We have previously discussed the new CDC guidelines for opioid prescribing practices. We have educated the patient about morphine milliequivalents. Patient has voiced understanding that medications may be decreased over the next several months if we  do not find adequate pain control or increased function as result of medication. We have also discussed with the patient that more frequent follow-ups will be based on the amount of pain medications used daily.     09/12/2024-Current Treatment: Medications and procedures. There are no new significant side effects or excessive drowsiness from medications.  Patient presents today for follow-up evaluation for pain problems. Patient has no new complaints today, and presents for medical management. There is no abuse or diversion of the patient's medications. The patient is tolerating the current regimen well with no adverse events, side effects, or untoward complications. Activities are tolerated well and function is maintained with current pain medications, UDS reviewed. Pt defers procedures @ this time. Will plan to continue current medications and follow up in 2 months. Finished therapy at [facility].      11/14/2024-pt here for a follow up for meds and reports that her neck and arm pain is worse and she would like to get an injection for her pain. After review of her records and based on her physical exam and symptoms of cervical radiculopathy we will plan to set her up for Cath guided OTONIEL @ C5-6 level. Due to the presence of cervical radicular symptoms, we have elected to proceed with cervical epidural steroid injections at the level confirmed by physical examination and accompanying studies. This is medically necessary to improve function, reduce pain and limitations, and to reduce the reliance on pain medications. Additional epidural steroid injections will be based on patient improvement. Cath guided OTONIEL @ C5-6 level #2 (M54.12) - Cervical radiculopathy     Cervical MRI dated 08/24/2023C5-6: Bilateral facet arthropathy. Disc is desiccated and mildly narrowed. There is disc bulge with superimposed broad-based posterior disc herniation measuring 3 mm AP dimension beyond vertebral body margin. There is mild abutment of  the ventral cord with minimal cord flattening and mild-moderate canal stenosis. Extension of broad-based disc herniation and associated uncinate hypertrophy in combination with the facet arthropathy results in moderate to severe bilateral foraminal stenosis, slightly greater on the left.        12/18/24 @ [facility]--#2 Cath Guided OTONIEL @ C5-6 Pre 6 Post 4     01/09/2025-pt here for a follow up for after her procedure and medication refill. She reports that last injection helped with her pain and activity but did not last long. She would like to schedule another one to help with her pain. Her pain today is 7/10.Due to the presence of cervical radicular symptoms, we have elected to proceed with cervical epidural steroid injections at the level confirmed by physical examination and accompanying studies. This is medically necessary to improve function, reduce pain and limitations, and to reduce the reliance on pain medications. Additional epidural steroid injections will be based on patient improvement. Cath guided OTONIEL @ C5-6 level  (M54.12) - Cervical radiculopathy     Cervical MRI dated 08/24/2023C5-6: Bilateral facet arthropathy. Disc is desiccated and mildly narrowed. There is disc bulge with superimposed broad-based posterior disc herniation measuring 3 mm AP dimension beyond vertebral body margin. There is mild abutment of the ventral cord with minimal cord flattening and mild-moderate canal stenosis. Extension of broad-based disc herniation and associated uncinate hypertrophy in combination with the facet arthropathy results in moderate to severe bilateral foraminal stenosis, slightly greater on the left.     OSWESTRY DISABILITY INDEX     1. Pain intensity                                                                                                          __ I have no pain at the moment +0  __ The pain is very mild at the moment +1  __ The pain is moderate at the moment +2   __ The pain is fairly severe at eh  moment +3  _x_ The pain is very severe at the moment +4  __ The pain is the worst imaginalble at the moment +5      2. Personal Care   __ I can look after myself normally without causing extra pain. +0  _x_ I can look after myself normally but it causes extra pain.  +1  __ It is painful to look after myself and I am slow and careful. +2  __ I need some help but can manage most of my personal care. +3  __ I need help every day in most aspects of self-care. +4  __ I do not get dressed, I wash with difficulty and stay in bed. +5     3. Lifting   __ I can lift heavy weights without extra pain. +0  __ I can lift heavy weights but it gives extra pain. +1  __ Pain prevents me from lifting heavy weights off the floor, but I can manage if they are conveniently placed, for example on a table +2  __ Pain prevents me from lifting heavy weights but I can manage light to medium weights if the are conveniently positioned. +3  _x_ I can only lift very light weights. + 4  __ I cannot lift or carry anything at all. +5     4. Walking     __Pain does not prevent me walking any distance. +0  _x_Pain prevents me from walking more than 1 mile. +1  __Pain prevents me from walking more than 1/2 mile +2  __Pain prevents me from walking more than 100 yards. +3  __I can only walk using a stick or crutches  +4  __I am in the bed most of the time.      5. Sitting  __I can sit in any chair as long as I like. +0  __I can only sit in my favorite chair as long as I like. +1  __Pain prevents me sitting more than 1 hour +2  _x_Pain prevents me from sitting more than 30 minutes. +3  __Pain prevents me from sitting more than 10 minutes. +4  __Pain prevents me from sitting at all. +5     6. Standing  __I can stand as long as I want without extra pain. +0  __I can stand as long as I want but it gives me extra pain +1  __Pain prevents from standing for more than 1 hour. +2  _x_Pain prevents from standing for more than 30 minutes.  +3  __Pain prevents from  standing for more than 10 minutes +4  __Pain prevents from standing at all. +5     7. Sleeping   __ My sleep is never disturbed by pain. +0  _x_ My sleep is occasionally disturbed by pain. +1  __ Because of pain I have <6 hours sleep. +2  __ Because of pain I have <4 hours sleep. +3  __ Because of pain I have <2 hours sleep. +4  __ Pain prevents me from sleeping at all. +5     8.Sex life (if applicable)  __ My sex life is normal and causes no extra pain. +0  _x_ My sex life is normal but causes some extra pain. +1  __ My sex life is nearly normal but is very painful. +2  __ My sex life is severly restricted by pain. +3  __ My sex life is nearly absent becasue of pain. +4  __ Pain prevents any sex. +5     ¿9. Social Life  __ My social life is normal and gives me no extra pain. +0  __ My social life is normal but increases the degree of pain. +1  __ Pain has no significant effect on my social life apart from limiting my more energetic interest, example sport. +2  __ Pain has restricted my social life and I do not go out as often. +3  _x_ Pain has restricted my social life to my home. +4   __ I have no social life because of pain. +5     10. Travelling   __ I can travel anywhere without pain. +0  __ I can travel anywhere but it gives me extra pain. +1  __ Pain is bad but I manage journeys over 2 hours. +2  _x_ Pain restricts me to journeys of < 1 hour. +3  __ Pain restricts me to short necessary journeys of < 30 minutes.. +4  __ Pain prevents me from travelling except to receive treatment. +5  =25  Percentage Score _____% PREPROCEDURE                  The previous urine drug screen was evaluated 11/14/2024 and it was consistent. A presumptive urine drug screen was done today to rapidly obtain and integrate results into clinical assessment and decision-making for ongoing safe prescribing of controlled substances. The results of the presumptive UDS done today was positive for oxy. She is prescribed percocet and  oxycontin. Because presumptive UDS positive results are not definitive due to sensitivity and specificity and cross reactivity limitations and negative results do not necessarily indicate absence of drugs or substances in the urine specimen, confirmation will identify specific prescribed and non-prescribed medications or illicit use for ongoing safe prescribing of controlled substances including benzodiazepines, opioid agonist, opioids antagonist, partial agonist, stimulants, muscle relaxers, antidepressants, sleep aids, anti-seizure medicine, and alcohol. Urine drug analysis is used to assist with diagnosis and therapeutic decision-making concerning pretreatment assessment. Intensity and frequency of monitoring with urine drug testing will be based on the risk stratification method in determining risk level for opioid addiction. *UDS Prelim  Consistent.        We have previously discussed the new CDC guidelines for opioid prescribing practices. We have educated the patient about morphine milliequivalents. Patient has voiced understanding that medications may be decreased over the next several months if we do not find adequate pain control or increased function as result of medication. We have also discussed with the patient that more frequent follow-ups will be based on the amount of pain medications used daily.         Nursing:   Pain Medication/Dose/Last Taken/# Taken Oxycontin ER 20 #60  Percocet 10 #90  Pregabalin 50 #90  Is it helping? Yes  Physical Therapy  no Home Exercises  [facility]  no New medical problems or surgeries  no New medications  no New allergies  no New antibiotics, fever, infection  Other 6/12/2024 Cath Guided OTONIEL @ C5-6 Pre 7 Post 3  12/18/24 @ [facility]--#2 Cath Guided OTONIEL @ C5-6 Pre 6 Post 4      Current Medications:   OxyCONTIN Oral Tablet ER 12 Hour Abuse-Deterrent 20 MG (1/9/2025) Take 1 tablet every 12 hours for 28 day(s)  Lyrica Oral Capsule 50 MG (1/9/2025) Take 1 capsule three times a day for  30 day(s)  Baclofen Oral Tablet 10 MG (1/9/2025) TAKE ONE TABLET BY MOUTH every night at bedtime  Percocet Oral Tablet  MG (1/9/2025) Take 1 tablet three times a day as needed for 30 day(s)      Previous Studies:  MRI; Findings  STUDY:  MRI thoracic spine  HISTORY:  Back pain right lower extremity pain for 6 years. Original injury 2018 related to a fall.  TECHNIQUE: Sagittal T1 weighted, T2 weighted and STIR images of the thoracic spine and axial T1 weighted and T2 weighted images of the thoracic spine are presented.    FINDINGS:   Coronal  view demonstrates moderate levoscoliosis of the thoracic spine. The height of the thoracic vertebral bodies is maintained and there is otherwise normal kyphotic curvature and alignment. There is some disc space narrowing and loss of disc signal consistent with disc desiccation discogenic change from the T5-6 through T9-10 levels. The remaining discs are normally hydrated. No obvious abnormal signal within the cord is seen.  T6-7: There is a prominent right paracentral disc herniation extending approximately 4 mm beyond the vertebral body margin and impinging on the right ventral aspect of the cord. There is flattening of the right ventral aspect of the cord. No central canal stenosis is seen. The neural canals remain patent superiorly.  T7-8: There is a left paracentral left lateral disc herniation extending approximately 3 mm beyond the vertebral body margin contributing to some narrowing of the left neural canal. No cord deformity is seen. No central canal stenosis is seen. The right neural canal is patent.  T9-10: There is a large left paracentral left lateral disc herniation which extends approximately 6-7 mm beyond the vertebral body margin and extends both above and below the disc level impinging on the left ventral aspect of the cord causing flattening of the left ventral aspect of the cord and contributing to mild central canal stenosis. Disc contributes to  left neural canal compromise. The right neural canal remains patent superiorly.    IMPRESSION:     1. Moderate levoscoliosis of the thoracic spine.  2. Prominent right paracentral disc herniation T6-7 with cord impingement.  3. Left paracentral left lateral disc herniation T7-8 contributing to left neural canal compromise.  4. Large left paracentral left lateral disc herniation T9-10 with cord impingement, central canal stenosis and left neural canal compromise.      Electronically Signed by AWILDA CALDERON at 14-Dec-2022 06:33:52 AM     Contrast Type and Amount: NO CONTRAST     EXAM:MRI cervical spine without contrast  HISTORY:  Cervical radiculopathy. Left-sided neck pain.  COMPARISON:  No prior exam of the cervical spine. Correlation made with MRI thoracic spine December 2022  TECHNIQUE:   Sagittal T1, T2, stir and axial T2 sequences of the cervical spine were performed without intravenous contrast.  FINDINGS:   Cervical vertebral alignment is normal. Vertebral bodies normal in height. Marrow signal pattern exhibited in the vertebrae is normal.  Imaged portions of the posterior fossa and craniocervical junction are normal.  C1-2: Alignment is normal. There is no canal narrowing.  C2-3: No disc herniation or canal stenosis. Neural foramina are patent.  C3-4: Uncinate hypertrophy and facet arthropathy mildly narrows right foraminal caliber. No disc herniation or central canal stenosis.  C4-5: Bilateral facet arthropathy.  The disc is desiccated and mildly bulging circumferentially approximately 2 mm. Disc bulge in combination with uncinate hypertrophy causes moderate right and mild left foraminal stenosis. Disc bulging mildly deforms the anterior thecal sac but there is no cord contact. Mild canal stenosis.  C5-6: Bilateral facet arthropathy. Disc is desiccated and mildly narrowed. There is disc bulge with superimposed broad-based posterior disc herniation measuring 3 mm AP dimension beyond vertebral body margin. There  is mild abutment of the ventral cord with minimal cord flattening and mild-moderate canal stenosis. Extension of broad-based disc herniation and associated uncinate hypertrophy in combination with the facet arthropathy results in moderate to severe bilateral foraminal stenosis, slightly greater on the left.  C6-7: Bilateral facet arthropathy. Disc desiccation and disc bulge with superimposed broad-based disc herniation centrally and lateralizing to the left. This disc herniation measures up to 3.4 mm beyond expected vertebral body margin. There is effacement of CSF anterior to the spinal cord with mild canal stenosis. Asymmetric extension of disc herniation into the left neural foramen causes severe left foraminal stenosis.  C7-T1: No disc herniation or significant spinal canal or foraminal narrowing. Upper thoracic levels are unremarkable.  No cord signal alteration. No concerning abnormality exhibited in the immediate paraspinal soft tissues.  IMPRESSION:     Broad-based posterior disc herniation centrally and lateralizing to the left at C6-7 effaces CSF anterior to the spinal cord mild canal stenosis and extends into and causes severe narrowing of the left neural foramen in combination with uncinate hypertrophy and facet arthropathy.  Disc bulge with superimposed broad-based posterior disc herniation and vertebral osteophyte complex at C5-6 causes mild cord abutment and mild to moderate canal stenosis. In combination with uncinate hypertrophy and facet arthropathy there is severe bilateral foraminal narrowing.  No cord signal alteration.    Electronically Signed by VALERIA ERIC at 24-Aug-2023 11:51:10 AM     Contrast Type and Amount: NO CONTRAST  <Addendum Signed by VALERIA ERIC at 22-Dec-2023 10:29:54 AM  No unanticipated enhancement detected on postcontrast images.  Addendum End>  EXAM: MRI lumbar spine with and without contrast  HISTORY:  Lumbar radiculopathy. Back injury from a fall with pain in the  upper left side  COMPARISON:  No previous lumbar spine exam. Correlation is made with MRI thoracic spine December 2022   TECHNIQUE:   Sagittal T1, T2, stir and axial T1 and T2 weighted sequences of the lumbar spine were performed before intravenous contrast. Subsequently intravenous contrast was administered and axial and sagittal T1 weighted sequences were performed.  FINDINGS:    There are postoperative changes of anterior and posterior fusion at L5-S1 across grade 1 spondylolisthesis. Bilateral pedicle screws and rods and anterior and interbody fusion hardware are present related to the fusion. There is no significant surrounding marrow or soft tissue edema to strongly indicate hardware complication. Above this level alignment of lumbar vertebrae is normal. Vertebral bodies are normal in height. Lower cord is normal in signal. Conus terminates at T12-L1.  Mild desiccation of disc at L4-5. Minimal osteophytosis and very subtle discogenic Modic type 2 endplate signal changes are present in the L4 and L5 vertebral bodies.  L1-2: No disc herniation or canal or foraminal narrowing.  L2-3: No disc herniation or canal or foraminal stenosis.  L3-4: No disc herniation or canal or foraminal stenosis.  L4-5: Disc is desiccated and there is 2 mm of circumferential bulging. No significant nerve root displacement or spinal canal stenosis. Neural foramina are patent.  L5-S1: Postoperative changes of fusion across grade 1 spondylolisthesis at this level. There is no residual disc. No spinal canal or foraminal stenosis.  Upper sacrum is unremarkable. No concerning abnormality exhibited in the immediate paraspinal soft tissues.  IMPRESSION:     Postoperative changes of fusion at L5-S1. No evidence of hardware complication or canal stenosis at the operative level. There is underlying grade 1 spondylolisthesis at this level.  Circumferential disc bulging at L4-5 without canal stenosis.  No acute fracture.     Electronically Signed  by VALERIA ERIC at 22-Dec-2023 10:15:52 AM  Amended by VALERIA ERIC at 22-Dec-2023 10:29:54 AM  Contrast Type and Amount: 13ML OF 15ML CLARISCAN GIVEN  EXAM: MRI thoracic spine without contrast  HISTORY:  Thoracic radiculopathy. Upper to mid back pain with recent worsening extending to the left side with some weakness.  COMPARISON:  MRI thoracic spine December 2022   TECHNIQUE:   Sagittal T1, T2, stir and axial T1 and T2 weighted sequences of the thoracic spine were performed without intravenous contrast.  FINDINGS:    Mildly accentuated thoracic kyphosis.  Moderate levoscoliosis of the thoracic spine also again exhibited.  The vertebral bodies normal in height.  There is multilevel disc desiccation and disc narrowing similar in pattern to the degenerative changes present on prior exam predominantly involving T5 through T10 levels.  Throughout these levels there are marginal osteophytes from anterior vertebral bodies .  There has been development of adjacent endplate defects in inferior T9 and superior T10 vertebral bodies with surrounding edema suggesting Schmorl's nodes and discogenic type 1 endplate signal change.  Mild chronic Modic type 2 predominant endplate signal changes are also seen in adjacent vertebral bodies at T6-7 and T7-8.  T1-2:  1 mm disc bulge.  No cord contact or canal narrowing.  T2-3:  1 mm disc bulge minimally deforms the anterior thecal sac lateralizing to the left. No cord contact or canal stenosis. Caliber of left neural foramen appears mildly narrowed from disc bulging and facet arthropathy.  Similar findings were present previously.  T3-4:  No disc herniation or canal narrowing.  T4-5:  No disc herniation or canal narrowing.  T5-6:  Disc is narrowed anteriorly. However there is no significant posterior disc herniation or canal narrowing.  T6-7:  Chronic degeneration of the disc which is moderately desiccated and narrowed.  Posteriorly there is a large right paracentral disc  herniation and vertebral osteophyte complex that measures 4 mm AP dimension deforming right aspect of the spinal cord with moderate indentation of the right ventral aspect of the cord that is very similar to that seen previously.  Adequate CSF remains present posterior to the cord without significant central canal stenosis.  Neural foramina are patent.  T7-8:  3 mm AP dimension left paracentral disc herniation deforms left anterior aspect of the thecal sac. This is unchanged.  No significant spinal canal narrowing.    T8-9:  1 mm of disc bulging.  No cord contact or canal narrowing.  T9-10:  Disc is desiccated and significantly narrowed.  There is a large left paracentral predominant disc herniation and vertebral osteophyte complex measuring 6 mm beyond vertebral body margin in AP dimension and approximately 10-11 mm craniocaudal extent moderately deforming the left aspect of the spinal cord displacing the cord to the right.  There is canal stenosis and an element of narrowing of the proximal left neural foramen.  No significant change compared to prior exam.  T10-11:  No disc herniation or canal narrowing.  T11-12:  No disc herniation or canal narrowing.  T12-L1:  No disc herniation or canal narrowing.  No areas of cord signal alteration despite the significant cord deformity again exhibited at T6-7 and T9-10 levels.  No concerning abnormality exhibited in the immediate paraspinal soft tissues.  IMPRESSION:     Levoscoliosis of the thoracic spine with multilevel disc degeneration and redemonstration of large chronic disc herniations causing significant cord deformity at T9-10 and T6-7 that are very similar to that seen previously.  Left paracentral disc herniation at T7-8 also appears similar in size compared to prior exam measuring approximately 3 mm AP dimension deforming left anterior aspect of the thecal sac.  There has been development of endplate defects typical for Schmorl's nodes in T9 and T10 vertebral  bodies with rim of surrounding marrow edema about the Schmorl's nodes.  The appearance of the thoracic spine has otherwise not significantly changed.  No cord signal alteration.      Electronically Signed by VALERIA ERIC at 15-Feb-2024 03:02:05 PM     Contrast Type and Amount: NO CONTRAST GIVEN   Surgical History:   Hysterectomy; Spinal Surgery   Uterine Suspension  Cholecystectomy  ALIF L5-S1      Social History:      Smoking Status: Never smoker; Last Reviewed: 01/09/2025                        Denies alcohol use  No drug use  Occupation: works full-time  Marital status: Single               Family History:   There is a family history of  Hypertension, Cancer  Heart Disease  .      Review of Systems:   General:  Patient denies  sweats, fatigue, fever, chills, recent change in weight.  Eyes:  Patient denies  blurred vision, glaucoma.  Ears, Nose and Throat:  Patient denies  hearing loss, ringing in the ears, sinus congestion, difficulty swallowing.  Cardiovascular:  Patient denies  arrhythmia, chest pain, palpitations.  Respiratory:  Patient denies  requiring oxygen, shortness of breath, cough, wheezing.  Gastrointestinal:  Patient denies  ulcer, heartburn, nausea, vomiting, blood in stool, diarrhea, constipation, hemorrhoids.  Genitourinary:  Patient denies  hematuria, kidney stones, urinary frequency, polyuria, urinary hesitancy, dysuria, burning on urination, prostate problems, menstrual complications, painful intercourse.  Endocrine:  Patient denies  polydipsia, temperature intolerance, thyroid problems, loss of hair from head or body.  Hematologic:  Patient denies  bleeding tendencies, easy bruising tendency, bleeding disorders, bleeding gums.  Musculoskeletal:   Patient admits to   joint pain, joint stiffness, muscle cramps.  Patient denies  fractures, walking aids.  Neurologic  Patient denies  dizziness, seizures, headache, not confused or disoriented, memory lapses or loss, paralysis, difficulty  walking.  Psychologic:  Patient denies  anxiety, panic attacks, mood disorder, emotional problems, depression, sleep disturbances, suicidal thoughts, suicide attempt(s).  Skin:  Patient denies  pruritus, jaundice, skin rash.       DEPRESSION SCREENING:   Not at all the patient reports little interest or pleasure in doing things.  Not at all the patient reports feeling down, depressed, or hopeless.  Date Depression Screening Last Done: 01/09/2025   PHQ-2 Score: 0; PHQ-9 Score: incomplete      Vital Signs:   Weight 132 lbs; Height 5 ft 2 in; BMI 24.1   01/09/2025 10:48 AM (CST)  Respiration Rate 18; Pulse Rate 107 bpm; Blood Pressure 110 / 75 mm/Hg; Pain Level: 7         Physical Examination:   Cranial Nerves II-XII grossly intact.  No apparent distress.  No somnolence or slurred speech.     Lumbar spine  patient has pain with flexion and extension lateral rotation  Lumbar facets are tender to palpation  No focal neurologic deficits noted      Back Motion:   Lumbar / lumbosacral spine abnormal.         Additional Physical Findings:  General abnormal,   Awake, alert, oriented to time, place and person, well developed, pleasant, uncomfortable, seated  Musculoskeletal abnormal,   Thoracic spine abnormal, thoracolumbar spine abnormal  Posture normal  Neurologic normal neurologic exam,   Cranial nerves, motor function  Gait and stance normal  Skin normal skin exam,   Dry, warm       Toxicology Report   Toxicology was performed.   Reason for Toxicology:  A presumptive urine drug screen was done today to rapidly obtain and integrate results into clinical assessment and decision-making for ongoing safe prescribing of controlled substances.                                       Assessment:   (M54.50) - Low back pain  (M47.817) - Lumbosacral spondylosis without myelopathy  (M54.6) - Thoracic back pain  (M54.2) - Neck pain  (M40.209) - Kyphosis  (Z98.890) - Hx of lumbosacral spine surgery  (M54.12) - Cervical  radiculopathy  (G89.29) - Other chronic pain  (Z79.891) - Long term (current) use of opiate analgesic      Plan:   Physical Activity Counseling   Nutrition Counseling      Analgesia: Patient reports adequate levels on analgesia.  Activity: Patient encouraged to exercise and continue normal activities.  Adverse Reactions:  No adverse reactions  Aberrant Behavior: No aberrant behavior noted.  appropriate and UDS consistent  Affect: Normal mood.  Adjuncts:        Functional Goals: Patient will have decreased pain with current medications and have increased function/activities.  Progress toward functioning goals: Pt will maintain/and or have increase in function and activity with current meds.  Reason for initiating/continuing opioids: Improve function, reduce pain, reduce use of er/urgent care and minimize organ toxicity from analgesics.  Continue medication doses as currently prescribed:  Other treatment modalities/referrals recommended:  Risks and benefits of test or referrals discussed:  Urine Drug screen ordered:  Contract/agreement signed or updated using lay language.  Follow up interval:  1-3 months  Follow up with Dr. Briceño.        1. The patient has chronic nonmalignant pain with pathology likely contributing to the symptoms and based on the improvement of pain and function in response to opioid medications; lack of serious side effects and limited identifiable aberrant behavior; I believe it is reasonable to prescribe opioid therapy which requires a greater than 72 hours supply of opioid therapy. Patient was educated on the potential dependency issues associated with long-term opioid use; as well as the decreasing efficacy with prolonged use. Patient has been advised of the risk/benefits and side effect and how to utilize each medication. Patient was informed that and deviation from therapy protocol could lead to discontinuation of opioids. Patient was given the opportunity to begin weaning off opioids.  Patient was given and opportunity to ask and have questions answered regarding the continuation of opioid therapy. At the time patient is at goal in terms of the pain treatment plan. Patients medications have been reviewed with patient. We will follow up with patient to review effectiveness of medication, and to discuss any potential side effects. The morphine milliequivalents (MME) have been calculated for this patient. According to the CDC guidelines, patients with an MME less than 50 should be monitored for pain and function frequently. Therefore this patient will be monitored every 1-3 months via office visits,  evaluations, and urinary drug screens.     2. Follow up in 2 weeks after procedure.  Patient may contact office for earlier follow-up should an issue arise.     3.  Due to the presence of cervical radicular symptoms, we have elected to proceed with cervical epidural steroid injections at the level confirmed by physical examination and accompanying studies. This is medically necessary to improve function, reduce pain and limitations, and to reduce the reliance on pain medications. Additional epidural steroid injections will be based on patient improvement. Cath guided OTONIEL @ C5-6 level (M54.12) - Cervical radiculopathy     Cervical MRI dated 08/24/2023C5-6: Bilateral facet arthropathy. Disc is desiccated and mildly narrowed. There is disc bulge with superimposed broad-based posterior disc herniation measuring 3 mm AP dimension beyond vertebral body margin. There is mild abutment of the ventral cord with minimal cord flattening and mild-moderate canal stenosis. Extension of broad-based disc herniation and associated uncinate hypertrophy in combination with the facet arthropathy results in moderate to severe bilateral foraminal stenosis, slightly greater on the left.     4. Procedure instructions given to pt who verbalized understanding. Procedure sheet given to the patient after verbally voicing  understanding of the sheet concerning blood thinners, NPO status, and importance of a .     5. Monitored Anesthesia Care medical necessity authorization request:       Monitor anesthesia request is medically indicated for the scheduled ___CESI_____procedure due to:     - needle phobia and anxiety, placing the patient at risk during the provided service.___x__  - patient has a BMI greater than 45 ____  - patient has severe sleep apnea for which BiPAP and oxygen are needed while sleeping._____  - patient is unable to follow simple commands due to mental state.____  - patient has an ASA class greater than 3 and requires constant presence of an anesthesiologist/CRNA during the procedure.____  - patient has severe problems with muscles and muscle spasticity that makes it hard to lie still. ____  - patient suffers from chronic pain and is unable to function due to diminished ADL's.__x__  - patient is dependent on opioids or sedatives._x___  - Other ___x_     NARCOTIC STATEMENT  Patient is taking the narcotic pain medications as prescribed. Refill is being given because of the benefit to the patient in regards to the pain. Patient has agreed not to abuse medication and not to take it more than what is prescribed. The nature of the drug including the potential for addiction and dependency and abuse was also discussed with the patient. Patient has developed physical dependency for the narcotic pain medication for his pain relief.  Patient has also developed tolerance to the sedative effect of the narcotic pain medications.  Patient has decided to continue with these medications despite potential for addiction as described by this office.  This was stressed to the patient that it is the patient's responsibility to secure the narcotic medication and in any event of loss for any reason whatsoever,  there will be no refill before the next due date. Patient also understands that they are not supposed to drive or work on  machinery while taking these medications.  Also explained to the patient that in the event of traffic citation, the presence of this drug in  bloodstream may result in DUI.  Patient has been advised not to drink alcohol while taking this medication.  Patient has verbalized understanding of our office policy and has signed a contract with us in this regard.        Compliance Statement:  Documented by Joan Ryan RN acting as a scribe for Claude Briceño M.D. The note accurately reflects work and decisions performed by me.      Prescriptions Written Today:  OxyCONTIN Oral Tablet ER 12 Hour Abuse-Deterrent 20 MG  Take 1 tablet every 12 hours for 28 day(s)  Refills: No Refills  Rx quantity: 56  Take 1 tablet every 12 hours for 28 day(s)  Refills: No Refills  Rx quantity: 56,  Lyrica Oral Capsule 50 MG  Take 1 capsule three times a day for 30 day(s)  Refills: 2  Rx quantity: 90,  Baclofen Oral Tablet 10 MG  Take 1 tablet every night at bedtime for 30 day(s)  Refills: 2  Rx quantity: 30,  Percocet Oral Tablet  MG  Take 1 tablet three times a day as needed for 30 day(s)  Refills: No Refills  Rx quantity: 90  Take 1 tablet three times a day as needed for 30 day(s)  Refills: No Refills  Rx quantity: 90                 Claude Briceño MD      Electronically signed: 1/12/2025 6:26:00 PM           Chief Complaint:      HPI:       Assessment/Plan:         Claude Briceño MD  Pain Management  [facility] - Pain Management

## (undated) DEVICE — SLIPPER FALL PREV YEL BARIAT

## (undated) DEVICE — KIT IV START

## (undated) DEVICE — APPLICATOR CHLORAPREP ORN 26ML

## (undated) DEVICE — NDL TUOHY EPIDRL PNK 18GX3.5IN

## (undated) DEVICE — GLOVE PROTEXIS PI SYN SURG 7.5

## (undated) DEVICE — TRAY NERVE BLOCK UNIV 10/CA

## (undated) DEVICE — TRAY EPIDURAL SINGLE SHOT PMA

## (undated) DEVICE — SYR EPILOR LUER-LOK LOR 7ML

## (undated) DEVICE — CATH VERSA-KATH 21G 12IN CLEAR

## (undated) DEVICE — CATH IV INTROCAN 22G X 1

## (undated) DEVICE — GLOVE SENSICARE PI SURG 6.5

## (undated) DEVICE — GLOVE SENSICARE PI ALOE 8

## (undated) DEVICE — GLOVE PROTEXIS PI SYN SURG 6.5

## (undated) DEVICE — SET EXT STD BORE CATH 7.6IN

## (undated) DEVICE — CATH INTROCAN SAF 2 IV 22GX1IN

## (undated) DEVICE — DRAPE IOBAN2 INCISE 10.5X8IN

## (undated) DEVICE — SLIPPER FALL PREV YELLOW XLG

## (undated) DEVICE — KIT IV START RUSH

## (undated) DEVICE — CATH IV 22G X 1 AUTOGUARD

## (undated) DEVICE — SOL CONTINU-FLO SET 2 LAV